# Patient Record
Sex: FEMALE | Race: WHITE | NOT HISPANIC OR LATINO | Employment: UNEMPLOYED | URBAN - METROPOLITAN AREA
[De-identification: names, ages, dates, MRNs, and addresses within clinical notes are randomized per-mention and may not be internally consistent; named-entity substitution may affect disease eponyms.]

---

## 2017-03-24 ENCOUNTER — HISTORICAL (OUTPATIENT)
Dept: LAB | Facility: HOSPITAL | Age: 57
End: 2017-03-24

## 2017-08-29 ENCOUNTER — HISTORICAL (OUTPATIENT)
Dept: LAB | Facility: HOSPITAL | Age: 57
End: 2017-08-29

## 2017-08-29 LAB
ABS NEUT (OLG): 2.5 X10(3)/MCL (ref 1.5–6.9)
BASOPHILS # BLD AUTO: 0 X10(3)/MCL (ref 0–0.1)
BASOPHILS NFR BLD AUTO: 0 % (ref 0–1)
EOSINOPHIL # BLD AUTO: 0.1 X10(3)/MCL (ref 0–0.6)
EOSINOPHIL NFR BLD AUTO: 3 % (ref 0–5)
ERYTHROCYTE [DISTWIDTH] IN BLOOD BY AUTOMATED COUNT: 13.2 % (ref 11.5–17)
ERYTHROCYTE [SEDIMENTATION RATE] IN BLOOD: 10 MM/HR (ref 0–20)
HCT VFR BLD AUTO: 41.9 % (ref 36–48)
HGB BLD-MCNC: 14.3 GM/DL (ref 12–16)
LYMPHOCYTES # BLD AUTO: 1.8 X10(3)/MCL (ref 0.5–4.1)
LYMPHOCYTES NFR BLD AUTO: 36 % (ref 15–40)
MCH RBC QN AUTO: 33 PG (ref 27–34)
MCHC RBC AUTO-ENTMCNC: 34 GM/DL (ref 31–36)
MCV RBC AUTO: 96 FL (ref 80–99)
MONOCYTES # BLD AUTO: 0.6 X10(3)/MCL (ref 0–1.1)
MONOCYTES NFR BLD AUTO: 11 % (ref 4–12)
NEUTROPHILS # BLD AUTO: 2.5 X10(3)/MCL (ref 1.5–6.9)
NEUTROPHILS NFR BLD AUTO: 49 % (ref 43–75)
PLATELET # BLD AUTO: 228 X10(3)/MCL (ref 140–400)
PMV BLD AUTO: 10.8 FL (ref 6.8–10)
RBC # BLD AUTO: 4.38 X10(6)/MCL (ref 4.2–5.4)
RHEUMATOID FACT SERPL-ACNC: <10 IU/ML (ref 0–15)
URATE SERPL-MCNC: 7.2 MG/DL (ref 2.6–7.2)
WBC # SPEC AUTO: 5 X10(3)/MCL (ref 4.5–11.5)

## 2018-08-08 ENCOUNTER — HISTORICAL (OUTPATIENT)
Dept: LAB | Facility: HOSPITAL | Age: 58
End: 2018-08-08

## 2018-08-08 LAB
ABS NEUT (OLG): 3.2 X10(3)/MCL (ref 1.5–6.9)
ALBUMIN SERPL-MCNC: 3.9 GM/DL (ref 3.4–5)
ALBUMIN/GLOB SERPL: 1.2 RATIO
ALP SERPL-CCNC: 42 UNIT/L (ref 30–113)
ALT SERPL-CCNC: 25 UNIT/L (ref 10–45)
AST SERPL-CCNC: 15 UNIT/L (ref 15–37)
BASOPHILS # BLD AUTO: 0 X10(3)/MCL (ref 0–0.1)
BASOPHILS NFR BLD AUTO: 0 % (ref 0–1)
BILIRUB SERPL-MCNC: 0.3 MG/DL (ref 0.1–0.9)
BILIRUBIN DIRECT+TOT PNL SERPL-MCNC: 0.1 MG/DL (ref 0–0.3)
BILIRUBIN DIRECT+TOT PNL SERPL-MCNC: 0.2 MG/DL
BUN SERPL-MCNC: 25 MG/DL (ref 10–20)
CALCIUM SERPL-MCNC: 9.3 MG/DL (ref 8–10.5)
CHLORIDE SERPL-SCNC: 106 MMOL/L (ref 100–108)
CO2 SERPL-SCNC: 30 MMOL/L (ref 21–35)
CREAT SERPL-MCNC: 0.96 MG/DL (ref 0.7–1.3)
EOSINOPHIL # BLD AUTO: 0.3 X10(3)/MCL (ref 0–0.6)
EOSINOPHIL NFR BLD AUTO: 4 % (ref 0–5)
ERYTHROCYTE [DISTWIDTH] IN BLOOD BY AUTOMATED COUNT: 13.3 % (ref 11.5–17)
GLOBULIN SER-MCNC: 3.3 GM/DL
GLUCOSE SERPL-MCNC: 87 MG/DL (ref 75–116)
HCT VFR BLD AUTO: 38.8 % (ref 36–48)
HGB BLD-MCNC: 12.9 GM/DL (ref 12–16)
LYMPHOCYTES # BLD AUTO: 2.5 X10(3)/MCL (ref 0.5–4.1)
LYMPHOCYTES NFR BLD AUTO: 38.8 % (ref 15–40)
MCH RBC QN AUTO: 33 PG (ref 27–34)
MCHC RBC AUTO-ENTMCNC: 33 GM/DL (ref 31–36)
MCV RBC AUTO: 98 FL (ref 80–99)
MONOCYTES # BLD AUTO: 0.5 X10(3)/MCL (ref 0–1.1)
MONOCYTES NFR BLD AUTO: 7 % (ref 4–12)
NEUTROPHILS # BLD AUTO: 3.2 X10(3)/MCL (ref 1.5–6.9)
NEUTROPHILS NFR BLD AUTO: 49 % (ref 43–75)
PLATELET # BLD AUTO: 196 X10(3)/MCL (ref 140–400)
PMV BLD AUTO: 10.6 FL (ref 6.8–10)
POTASSIUM SERPL-SCNC: 4 MMOL/L (ref 3.6–5.2)
PROT SERPL-MCNC: 7.2 GM/DL (ref 6.4–8.2)
RBC # BLD AUTO: 3.95 X10(6)/MCL (ref 4.2–5.4)
SODIUM SERPL-SCNC: 143 MMOL/L (ref 135–145)
WBC # SPEC AUTO: 6.4 X10(3)/MCL (ref 4.5–11.5)

## 2020-06-22 ENCOUNTER — HISTORICAL (OUTPATIENT)
Dept: LAB | Facility: HOSPITAL | Age: 60
End: 2020-06-22

## 2020-06-22 LAB
ABS NEUT (OLG): 2.5 X10(3)/MCL (ref 1.5–6.9)
ALBUMIN SERPL-MCNC: 3.7 GM/DL (ref 3.4–4.8)
ALBUMIN/GLOB SERPL: 1.2 RATIO (ref 1.1–2)
ALP SERPL-CCNC: 54 UNIT/L (ref 40–150)
ALT SERPL-CCNC: 15 UNIT/L (ref 0–55)
AST SERPL-CCNC: 12 UNIT/L (ref 5–34)
BASOPHILS # BLD AUTO: 0 X10(3)/MCL (ref 0–0.1)
BASOPHILS NFR BLD AUTO: 1 % (ref 0–1)
BILIRUB SERPL-MCNC: 0.4 MG/DL
BILIRUBIN DIRECT+TOT PNL SERPL-MCNC: 0.1 MG/DL (ref 0–0.5)
BILIRUBIN DIRECT+TOT PNL SERPL-MCNC: 0.3 MG/DL (ref 0–0.8)
BUN SERPL-MCNC: 19 MG/DL (ref 9.8–20.1)
CALCIUM SERPL-MCNC: 9.2 MG/DL (ref 8.4–10.2)
CHLORIDE SERPL-SCNC: 109 MMOL/L (ref 98–107)
CHOLEST SERPL-MCNC: 275 MG/DL
CHOLEST/HDLC SERPL: 6 {RATIO} (ref 0–5)
CO2 SERPL-SCNC: 26 MMOL/L (ref 23–31)
CREAT SERPL-MCNC: 1.09 MG/DL (ref 0.55–1.02)
EOSINOPHIL # BLD AUTO: 0.2 X10(3)/MCL (ref 0–0.6)
EOSINOPHIL NFR BLD AUTO: 4 % (ref 0–5)
ERYTHROCYTE [DISTWIDTH] IN BLOOD BY AUTOMATED COUNT: 12.9 % (ref 11.5–17)
GLOBULIN SER-MCNC: 3.1 GM/DL (ref 2.4–3.5)
GLUCOSE SERPL-MCNC: 95 MG/DL (ref 82–115)
HCT VFR BLD AUTO: 39.9 % (ref 36–48)
HDLC SERPL-MCNC: 44 MG/DL (ref 35–60)
HGB BLD-MCNC: 13.3 GM/DL (ref 12–16)
IMM GRANULOCYTES # BLD AUTO: 0.01 10*3/UL (ref 0–0.02)
IMM GRANULOCYTES NFR BLD AUTO: 0.2 % (ref 0–0.43)
LDLC SERPL CALC-MCNC: 191 MG/DL (ref 50–140)
LYMPHOCYTES # BLD AUTO: 2.2 X10(3)/MCL (ref 0.5–4.1)
LYMPHOCYTES NFR BLD AUTO: 40 % (ref 15–40)
MCH RBC QN AUTO: 32 PG (ref 27–34)
MCHC RBC AUTO-ENTMCNC: 33 GM/DL (ref 31–36)
MCV RBC AUTO: 96 FL (ref 80–99)
MONOCYTES # BLD AUTO: 0.5 X10(3)/MCL (ref 0–1.1)
MONOCYTES NFR BLD AUTO: 9 % (ref 4–12)
NEUTROPHILS # BLD AUTO: 2.5 X10(3)/MCL (ref 1.5–6.9)
NEUTROPHILS NFR BLD AUTO: 46 % (ref 43–75)
PLATELET # BLD AUTO: 196 X10(3)/MCL (ref 140–400)
PMV BLD AUTO: 10 FL (ref 6.8–10)
POTASSIUM SERPL-SCNC: 4.1 MMOL/L (ref 3.5–5.1)
PROT SERPL-MCNC: 6.8 GM/DL (ref 5.8–7.6)
RBC # BLD AUTO: 4.15 X10(6)/MCL (ref 4.2–5.4)
SODIUM SERPL-SCNC: 143 MMOL/L (ref 136–145)
TRIGL SERPL-MCNC: 198 MG/DL (ref 37–140)
TSH SERPL-ACNC: 2.2 UIU/ML (ref 0.35–4.94)
VLDLC SERPL CALC-MCNC: 40 MG/DL
WBC # SPEC AUTO: 5.4 X10(3)/MCL (ref 4.5–11.5)

## 2020-12-22 ENCOUNTER — HISTORICAL (OUTPATIENT)
Dept: LAB | Facility: HOSPITAL | Age: 60
End: 2020-12-22

## 2020-12-22 LAB
ABS NEUT (OLG): 2.3 X10(3)/MCL (ref 1.5–6.9)
ALBUMIN SERPL-MCNC: 4 GM/DL (ref 3.4–4.8)
ALBUMIN/GLOB SERPL: 1.3 RATIO (ref 1.1–2)
ALP SERPL-CCNC: 68 UNIT/L (ref 40–150)
ALT SERPL-CCNC: 22 UNIT/L (ref 0–55)
AST SERPL-CCNC: 16 UNIT/L (ref 5–34)
BASOPHILS # BLD AUTO: 0 X10(3)/MCL (ref 0–0.1)
BASOPHILS NFR BLD AUTO: 1 % (ref 0–1)
BILIRUB SERPL-MCNC: 0.5 MG/DL
BILIRUBIN DIRECT+TOT PNL SERPL-MCNC: 0.2 MG/DL (ref 0–0.5)
BILIRUBIN DIRECT+TOT PNL SERPL-MCNC: 0.3 MG/DL (ref 0–0.8)
BUN SERPL-MCNC: 16 MG/DL (ref 9.8–20.1)
CALCIUM SERPL-MCNC: 9.4 MG/DL (ref 8.4–10.2)
CHLORIDE SERPL-SCNC: 105 MMOL/L (ref 98–107)
CHOLEST SERPL-MCNC: 195 MG/DL
CHOLEST/HDLC SERPL: 5 {RATIO} (ref 0–5)
CO2 SERPL-SCNC: 27 MMOL/L (ref 23–31)
CREAT SERPL-MCNC: 1.07 MG/DL (ref 0.55–1.02)
EOSINOPHIL # BLD AUTO: 0.1 X10(3)/MCL (ref 0–0.6)
EOSINOPHIL NFR BLD AUTO: 3 % (ref 0–5)
ERYTHROCYTE [DISTWIDTH] IN BLOOD BY AUTOMATED COUNT: 12.6 % (ref 11.5–17)
GLOBULIN SER-MCNC: 3.1 GM/DL (ref 2.4–3.5)
GLUCOSE SERPL-MCNC: 100 MG/DL (ref 82–115)
HCT VFR BLD AUTO: 40.9 % (ref 36–48)
HDLC SERPL-MCNC: 43 MG/DL (ref 35–60)
HGB BLD-MCNC: 13.3 GM/DL (ref 12–16)
IMM GRANULOCYTES # BLD AUTO: 0.02 10*3/UL (ref 0–0.02)
IMM GRANULOCYTES NFR BLD AUTO: 0.4 % (ref 0–0.43)
LDLC SERPL CALC-MCNC: 116 MG/DL (ref 50–140)
LYMPHOCYTES # BLD AUTO: 2 X10(3)/MCL (ref 0.5–4.1)
LYMPHOCYTES NFR BLD AUTO: 41 % (ref 15–40)
MCH RBC QN AUTO: 32 PG (ref 27–34)
MCHC RBC AUTO-ENTMCNC: 32 GM/DL (ref 31–36)
MCV RBC AUTO: 97 FL (ref 80–99)
MONOCYTES # BLD AUTO: 0.5 X10(3)/MCL (ref 0–1.1)
MONOCYTES NFR BLD AUTO: 10 % (ref 4–12)
NEUTROPHILS # BLD AUTO: 2.3 X10(3)/MCL (ref 1.5–6.9)
NEUTROPHILS NFR BLD AUTO: 46 % (ref 43–75)
PLATELET # BLD AUTO: 217 X10(3)/MCL (ref 140–400)
PMV BLD AUTO: 10.1 FL (ref 6.8–10)
POTASSIUM SERPL-SCNC: 4 MMOL/L (ref 3.5–5.1)
PROT SERPL-MCNC: 7.1 GM/DL (ref 5.8–7.6)
RBC # BLD AUTO: 4.2 X10(6)/MCL (ref 4.2–5.4)
SODIUM SERPL-SCNC: 142 MMOL/L (ref 136–145)
TRIGL SERPL-MCNC: 181 MG/DL (ref 37–140)
TSH SERPL-ACNC: 1.95 UIU/ML (ref 0.35–4.94)
VLDLC SERPL CALC-MCNC: 36 MG/DL
WBC # SPEC AUTO: 5 X10(3)/MCL (ref 4.5–11.5)

## 2021-02-23 LAB
ABS NEUT (OLG): 2 X10(3)/MCL (ref 1.5–6.9)
ALBUMIN SERPL-MCNC: 3.9 GM/DL (ref 3.4–4.8)
ALBUMIN/GLOB SERPL: 1.1 RATIO (ref 1.1–2)
ALP SERPL-CCNC: 67 UNIT/L (ref 40–150)
ALT SERPL-CCNC: 29 UNIT/L (ref 0–55)
APTT PPP: 29 SEC (ref 23.4–34.9)
AST SERPL-CCNC: 22 UNIT/L (ref 5–34)
BILIRUB SERPL-MCNC: 0.4 MG/DL
BILIRUBIN DIRECT+TOT PNL SERPL-MCNC: 0.2 MG/DL (ref 0–0.5)
BILIRUBIN DIRECT+TOT PNL SERPL-MCNC: 0.2 MG/DL (ref 0–0.8)
BUN SERPL-MCNC: 19 MG/DL (ref 9.8–20.1)
CALCIUM SERPL-MCNC: 9.3 MG/DL (ref 8.4–10.2)
CHLORIDE SERPL-SCNC: 102 MMOL/L (ref 98–107)
CO2 SERPL-SCNC: 28 MMOL/L (ref 23–31)
CREAT SERPL-MCNC: 1.16 MG/DL (ref 0.55–1.02)
ERYTHROCYTE [DISTWIDTH] IN BLOOD BY AUTOMATED COUNT: 12.5 % (ref 11.5–17)
GLOBULIN SER-MCNC: 3.4 GM/DL (ref 2.4–3.5)
GLUCOSE SERPL-MCNC: 91 MG/DL (ref 82–115)
HCT VFR BLD AUTO: 40.4 % (ref 36–48)
HGB BLD-MCNC: 13.5 GM/DL (ref 12–16)
INR PPP: 1 (ref 2–3)
MCH RBC QN AUTO: 32 PG (ref 27–34)
MCHC RBC AUTO-ENTMCNC: 33 GM/DL (ref 31–36)
MCV RBC AUTO: 96 FL (ref 80–99)
PLATELET # BLD AUTO: 194 X10(3)/MCL (ref 140–400)
PMV BLD AUTO: 10.2 FL (ref 6.8–10)
POTASSIUM SERPL-SCNC: 3.9 MMOL/L (ref 3.5–5.1)
PROT SERPL-MCNC: 7.3 GM/DL (ref 5.8–7.6)
PROTHROMBIN TIME: 12.7 SEC (ref 11.7–14.5)
RBC # BLD AUTO: 4.22 X10(6)/MCL (ref 4.2–5.4)
SODIUM SERPL-SCNC: 139 MMOL/L (ref 136–145)
WBC # SPEC AUTO: 4.5 X10(3)/MCL (ref 4.5–11.5)

## 2021-02-25 ENCOUNTER — HISTORICAL (OUTPATIENT)
Dept: ANESTHESIOLOGY | Facility: HOSPITAL | Age: 61
End: 2021-02-25

## 2021-03-15 LAB
ABS NEUT (OLG): 2.91 X10(3)/MCL (ref 2.1–9.2)
ALBUMIN SERPL-MCNC: 4.2 GM/DL (ref 3.4–4.8)
ALBUMIN/GLOB SERPL: 1.4 RATIO (ref 1.1–2)
ALP SERPL-CCNC: 70 UNIT/L (ref 40–150)
ALT SERPL-CCNC: 15 UNIT/L (ref 0–55)
AST SERPL-CCNC: 14 UNIT/L (ref 5–34)
BASOPHILS # BLD AUTO: 0 X10(3)/MCL (ref 0–0.2)
BASOPHILS NFR BLD AUTO: 1 %
BILIRUB SERPL-MCNC: 0.6 MG/DL
BILIRUBIN DIRECT+TOT PNL SERPL-MCNC: 0.2 MG/DL (ref 0–0.5)
BILIRUBIN DIRECT+TOT PNL SERPL-MCNC: 0.4 MG/DL (ref 0–0.8)
BUN SERPL-MCNC: 15.5 MG/DL (ref 9.8–20.1)
CALCIUM SERPL-MCNC: 9.6 MG/DL (ref 8.4–10.2)
CHLORIDE SERPL-SCNC: 105 MMOL/L (ref 98–107)
CO2 SERPL-SCNC: 28 MMOL/L (ref 23–31)
CREAT SERPL-MCNC: 1.12 MG/DL (ref 0.55–1.02)
EOSINOPHIL # BLD AUTO: 0.2 X10(3)/MCL (ref 0–0.9)
EOSINOPHIL NFR BLD AUTO: 4 %
ERYTHROCYTE [DISTWIDTH] IN BLOOD BY AUTOMATED COUNT: 12.7 % (ref 11.5–17)
GLOBULIN SER-MCNC: 2.9 GM/DL (ref 2.4–3.5)
GLUCOSE SERPL-MCNC: 81 MG/DL (ref 82–115)
HCT VFR BLD AUTO: 41.3 % (ref 37–47)
HGB BLD-MCNC: 13.6 GM/DL (ref 12–16)
LYMPHOCYTES # BLD AUTO: 2.6 X10(3)/MCL (ref 0.6–4.6)
LYMPHOCYTES NFR BLD AUTO: 41 %
MCH RBC QN AUTO: 32.4 PG (ref 27–31)
MCHC RBC AUTO-ENTMCNC: 32.9 GM/DL (ref 33–36)
MCV RBC AUTO: 98.3 FL (ref 80–94)
MONOCYTES # BLD AUTO: 0.5 X10(3)/MCL (ref 0.1–1.3)
MONOCYTES NFR BLD AUTO: 8 %
NEUTROPHILS # BLD AUTO: 2.91 X10(3)/MCL (ref 2.1–9.2)
NEUTROPHILS NFR BLD AUTO: 46 %
PLATELET # BLD AUTO: 226 X10(3)/MCL (ref 130–400)
PMV BLD AUTO: 11.2 FL (ref 9.4–12.4)
POTASSIUM SERPL-SCNC: 4.3 MMOL/L (ref 3.5–5.1)
PROT SERPL-MCNC: 7.1 GM/DL (ref 5.8–7.6)
RBC # BLD AUTO: 4.2 X10(6)/MCL (ref 4.2–5.4)
SARS-COV-2 RNA RESP QL NAA+PROBE: NOT DETECTED
SODIUM SERPL-SCNC: 143 MMOL/L (ref 136–145)
WBC # SPEC AUTO: 6.4 X10(3)/MCL (ref 4.5–11.5)

## 2021-03-18 ENCOUNTER — HISTORICAL (OUTPATIENT)
Dept: SURGERY | Facility: HOSPITAL | Age: 61
End: 2021-03-18

## 2021-05-10 LAB
ABS NEUT (OLG): 3.4 X10(3)/MCL (ref 1.5–6.9)
ALBUMIN SERPL-MCNC: 3.8 GM/DL (ref 3.4–4.8)
ALBUMIN/GLOB SERPL: 1.1 RATIO (ref 1.1–2)
ALP SERPL-CCNC: 76 UNIT/L (ref 40–150)
ALT SERPL-CCNC: 19 UNIT/L (ref 0–55)
APTT PPP: 32.9 SEC (ref 23.4–34.9)
AST SERPL-CCNC: 12 UNIT/L (ref 5–34)
BASOPHILS # BLD AUTO: 0.1 X10(3)/MCL (ref 0–0.1)
BASOPHILS NFR BLD AUTO: 1 % (ref 0–1)
BILIRUB SERPL-MCNC: 0.2 MG/DL
BILIRUBIN DIRECT+TOT PNL SERPL-MCNC: 0.1 MG/DL (ref 0–0.5)
BILIRUBIN DIRECT+TOT PNL SERPL-MCNC: 0.1 MG/DL (ref 0–0.8)
BUN SERPL-MCNC: 18 MG/DL (ref 9.8–20.1)
CALCIUM SERPL-MCNC: 9.7 MG/DL (ref 8.4–10.2)
CHLORIDE SERPL-SCNC: 108 MMOL/L (ref 98–107)
CHOLEST SERPL-MCNC: 170 MG/DL
CHOLEST/HDLC SERPL: 4 {RATIO} (ref 0–5)
CO2 SERPL-SCNC: 28 MMOL/L (ref 23–31)
CREAT SERPL-MCNC: 1.21 MG/DL (ref 0.55–1.02)
EOSINOPHIL # BLD AUTO: 0.2 X10(3)/MCL (ref 0–0.6)
EOSINOPHIL NFR BLD AUTO: 3 % (ref 0–5)
ERYTHROCYTE [DISTWIDTH] IN BLOOD BY AUTOMATED COUNT: 13 % (ref 11.5–17)
GLOBULIN SER-MCNC: 3.4 GM/DL (ref 2.4–3.5)
GLUCOSE SERPL-MCNC: 94 MG/DL (ref 82–115)
HCT VFR BLD AUTO: 38.5 % (ref 36–48)
HDLC SERPL-MCNC: 43 MG/DL (ref 35–60)
HGB BLD-MCNC: 12.9 GM/DL (ref 12–16)
IMM GRANULOCYTES # BLD AUTO: 0.04 10*3/UL (ref 0–0.02)
IMM GRANULOCYTES NFR BLD AUTO: 0.6 % (ref 0–0.43)
INR PPP: 0.9 (ref 2–3)
LDLC SERPL CALC-MCNC: 104 MG/DL (ref 50–140)
LYMPHOCYTES # BLD AUTO: 2.6 X10(3)/MCL (ref 0.5–4.1)
LYMPHOCYTES NFR BLD AUTO: 38 % (ref 15–40)
MCH RBC QN AUTO: 33 PG (ref 27–34)
MCHC RBC AUTO-ENTMCNC: 34 GM/DL (ref 31–36)
MCV RBC AUTO: 99 FL (ref 80–99)
MONOCYTES # BLD AUTO: 0.6 X10(3)/MCL (ref 0–1.1)
MONOCYTES NFR BLD AUTO: 8 % (ref 4–12)
NEUTROPHILS # BLD AUTO: 3.4 X10(3)/MCL (ref 1.5–6.9)
NEUTROPHILS NFR BLD AUTO: 50 % (ref 43–75)
PLATELET # BLD AUTO: 300 X10(3)/MCL (ref 140–400)
PMV BLD AUTO: 9.8 FL (ref 6.8–10)
POTASSIUM SERPL-SCNC: 4.2 MMOL/L (ref 3.5–5.1)
PROT SERPL-MCNC: 7.2 GM/DL (ref 5.8–7.6)
PROTHROMBIN TIME: 12.3 SEC (ref 11.7–14.5)
RBC # BLD AUTO: 3.9 X10(6)/MCL (ref 4.2–5.4)
SODIUM SERPL-SCNC: 144 MMOL/L (ref 136–145)
TRIGL SERPL-MCNC: 116 MG/DL (ref 37–140)
TSH SERPL-ACNC: 2.19 UIU/ML (ref 0.35–4.94)
VLDLC SERPL CALC-MCNC: 23 MG/DL
WBC # SPEC AUTO: 6.9 X10(3)/MCL (ref 4.5–11.5)

## 2021-05-13 ENCOUNTER — HISTORICAL (OUTPATIENT)
Dept: ANESTHESIOLOGY | Facility: HOSPITAL | Age: 61
End: 2021-05-13

## 2021-07-30 ENCOUNTER — HISTORICAL (OUTPATIENT)
Dept: RADIOLOGY | Facility: HOSPITAL | Age: 61
End: 2021-07-30

## 2021-08-02 ENCOUNTER — HISTORICAL (OUTPATIENT)
Dept: RADIOLOGY | Facility: HOSPITAL | Age: 61
End: 2021-08-02

## 2021-11-15 ENCOUNTER — HISTORICAL (OUTPATIENT)
Dept: RADIOLOGY | Facility: HOSPITAL | Age: 61
End: 2021-11-15

## 2022-04-30 NOTE — OP NOTE
PREOPERATIVE DIAGNOSES:  Nausea, bloating.    POSTOPERATIVE DIAGNOSES:    1. Verrucous lesion of the midesophagus at 20 cm, cold biopsy and removed.  2. Grade A hiatal hernia.  3. Mild to moderate chronic gastritis.    4. Status post H pylori biopsy gastric antrum.    5. Mild duodenitis.     A 60-year-old white female patient of Dr. Cayden Menjivar with bloating, abdominal cramping, subjective hyperactive bowel sounds and occasional diarrhea.  The patient was consented for the procedure in my office.  The risks and benefits of the procedure were explained to her in detail.  She is willing to undergo the risks.    PROCEDURE IN DETAIL:  She was brought down to the endoscopy suite, laid in the semi left lateral decubitus position, Demar Mendenhall CRNA, present with [employer].  Please see documentation of medications administered.     The patient was anesthetized prior to which she had some lidocaine spray and then a bite block was placed and a POM mask.  The   Olympus gastroscope was then lubricated, inserted the POM mask down into the posterior oropharynx whereupon there were no abnormalities.  Laryngeal region cords were all within normal limits.     The scope was then used, inserted through the piriform recess down through the esophageal column, whereupon there was a hernia that was present.  See details stated later.     The scope was placed into the gastric cavity.  There was some mild to moderate chronic gastritis present.  There was no active ooze, but there was potential therefore submucosally.       The scope was then used to penetrate the pylorus.  The duodenum was then evaluated.  There was some mild duodenitis that was present but no active lesions, ulcers, or masses.  The scope was pulled back and a biopsy taken of the gastric antrum, sent off for H pylori.  There were no other masses, polyps to the greater and lesser curvatures and angularis.  Just gastritis.     The scope was turned  into retroflexion.  Again, more prominent gastritis that was there.  I could see some mild areas of potential for mucosal ooze, but not active.  There was a hiatal hernia that was present grade A.  The scope was pulled back and I evaluated the hernia from a superior perspective.  The Z-line was intact with no signs of Motta's.  No signs of varices.  No other pathology except for hernia.  The scope was then used to assess the rest of the esophageal column.     At 20 cm from the teeth, there was a small, verrucous looking lesion that was cold biopsy removed, sent off for pathology in jar #2.  We will follow up on the pathology of that specifically.  The rest of the esophageal column was normal, peristalsis normal.     The patient tolerated the procedure well without any complications.    HISTORY OF PRESENT ILLNESS:  A 60-year-old white female with the above finding.  We will continue to follow up on those lesions, try to focus on reflux maneuvers with her and trying to mitigate some issues to prevent progression of her hiatal hernia that is present, not causing her a significant problem at this time from the esophageal standpoint.  Again, focus more on gastric issues with gastritis.        Thank you to Dr. Menjivar for allowing me to participate in the care of this patient.        RIGO/JOSE   DD: 02/25/2021 0845   DT: 02/25/2021 0924  Job # [id number]    cc: Cayden Menjivar M.D.

## 2022-04-30 NOTE — OP NOTE
PREOPERATIVE DIAGNOSIS:  Screening colonoscopy.    POSTOPERATIVE DIAGNOSIS:  Normal colon, suboptimal prep.    INDICATIONS:  A 61-year-old white female never having undergone screening colonoscopy.  Average colorectal cancer risk.  No risk factors.  No __________ symptoms.    DESCRIPTION OF PROCEDURE:  The patient was consented for the procedure in my office.  The risks and benefits of the procedure were explained to her in detail.  She is willing to undergo the risks.     She was brought down to the endoscopy suite, laid in the left lateral decubitus position.     Terrence Brush CRNA, present.  Please see his documentation for medications administered.       Rectal exam was performed.  It was within normal limits.  No internal nor external abnormalities.  Good rectal tone.     The Olympus colonoscope was lubricated, advanced all the way to the cecum.  The cecum was visualized and photographed.  She did have some mild laxity to the right side.  I could not fully intubate the terminal ileum but I could clearly see its orifice.  There were no abnormalities emanating from it.  I washed around it as much as I could in the cecum.  She had a suboptimal prep.  I did not see any obvious masses or polyps.     360-degree circumferential views were taken of the entire colon.  There were no abnormalities noted to the ascending colon, hepatic flexure, transverse colon, splenic flexure, descending, and sigmoid.  The rectum in retroflexion showed no internal abnormalities.     In summary, a 61-year-old white female with normal colon, otherwise suboptimal prep.     Because of this, will recommend repeat colonoscopy in 5 years unless clinically indicated sooner.       Thank you, Dr. Menjivar, for allowing me assist in the care of this patient.        RIGO/JOSE   DD: 05/13/2021 0822   DT: 05/13/2021 0936  Job # [id number]    cc: MARINA Dang III, M.D.

## 2022-04-30 NOTE — OP NOTE
DATE OF SURGERY:    03/18/2021    SURGEON:  Tong Rangel MD    PREOPERATIVE DIAGNOSIS:  Stress urinary incontinence.    POSTOPERATIVE DIAGNOSIS:  Stress urinary incontinence.    PROCEDURE:  Cystoscopy with Macroplastique injection of the bladder neck.    DESCRIPTION OF PROCEDURE:  After informed consent was obtained, the patient was taken to the operating room.  After a preoperative dose of antibiotics, she was given a general anesthetic and placed in a dorsal lithotomy position.  Her genitals were prepped and draped in the usual sterile fashion.  I placed the hysteroscope through the urethra into the bladder.  Bladder was inspected.  There were no masses, lesions, or stones identified.  I came back to a couple of centimeters distal to the bladder neck.  I placed my Macroplastique needle in submucosal position, first in the 3 o'clock position and injected a half of Macroplastique syringe on that side.  I waited approximately 1 minute before removing the needle.  I then placed the needle in the submucosal position in the 9 o'clock position and injected the other half of the Macroplastique syringe.  Again, I had the needle in place approximately 1 minute.  When it came out, she had great coaptation of the urethra.  I removed the hysteroscope.  She was extubated and brought to the recovery room in good condition.        ______________________________  MD KIMBERLY Bryant/HARVEY  DD:  03/18/2021  Time:  03:04PM  DT:  03/18/2021  Time:  03:13PM  Job #:  [id number]

## 2022-12-06 ENCOUNTER — LAB VISIT (OUTPATIENT)
Dept: LAB | Facility: HOSPITAL | Age: 62
End: 2022-12-06
Attending: FAMILY MEDICINE
Payer: COMMERCIAL

## 2022-12-06 DIAGNOSIS — I11.9 MALIGNANT HYPERTENSIVE HEART DISEASE WITHOUT HEART FAILURE: Primary | ICD-10-CM

## 2022-12-06 LAB
ALBUMIN SERPL-MCNC: 4.2 GM/DL (ref 3.4–4.8)
ALBUMIN/GLOB SERPL: 1.4 RATIO (ref 1.1–2)
ALP SERPL-CCNC: 70 UNIT/L (ref 40–150)
ALT SERPL-CCNC: 11 UNIT/L (ref 0–55)
AST SERPL-CCNC: 17 UNIT/L (ref 5–34)
BASOPHILS # BLD AUTO: 0.04 X10(3)/MCL (ref 0–0.2)
BASOPHILS NFR BLD AUTO: 0.6 %
BILIRUBIN DIRECT+TOT PNL SERPL-MCNC: 0.6 MG/DL
BUN SERPL-MCNC: 17 MG/DL (ref 9.8–20.1)
CALCIUM SERPL-MCNC: 10 MG/DL (ref 8.4–10.2)
CHLORIDE SERPL-SCNC: 104 MMOL/L (ref 98–107)
CHOLEST SERPL-MCNC: 210 MG/DL
CHOLEST/HDLC SERPL: 4 {RATIO} (ref 0–5)
CO2 SERPL-SCNC: 27 MMOL/L (ref 23–31)
CREAT SERPL-MCNC: 1.1 MG/DL (ref 0.55–1.02)
EOSINOPHIL # BLD AUTO: 0.1 X10(3)/MCL (ref 0–0.9)
EOSINOPHIL NFR BLD AUTO: 1.5 %
ERYTHROCYTE [DISTWIDTH] IN BLOOD BY AUTOMATED COUNT: 12.7 % (ref 11.5–17)
GFR SERPLBLD CREATININE-BSD FMLA CKD-EPI: 57 MLS/MIN/1.73/M2
GLOBULIN SER-MCNC: 3 GM/DL (ref 2.4–3.5)
GLUCOSE SERPL-MCNC: 90 MG/DL (ref 82–115)
HCT VFR BLD AUTO: 42.2 % (ref 37–47)
HDLC SERPL-MCNC: 51 MG/DL (ref 35–60)
HGB BLD-MCNC: 14.1 GM/DL (ref 12–16)
IMM GRANULOCYTES # BLD AUTO: 0.02 X10(3)/MCL (ref 0–0.04)
IMM GRANULOCYTES NFR BLD AUTO: 0.3 %
LDLC SERPL CALC-MCNC: 121 MG/DL (ref 50–140)
LYMPHOCYTES # BLD AUTO: 2.53 X10(3)/MCL (ref 0.6–4.6)
LYMPHOCYTES NFR BLD AUTO: 37.5 %
MCH RBC QN AUTO: 32.6 PG (ref 27–31)
MCHC RBC AUTO-ENTMCNC: 33.4 MG/DL (ref 33–36)
MCV RBC AUTO: 97.7 FL (ref 80–94)
MONOCYTES # BLD AUTO: 0.61 X10(3)/MCL (ref 0.1–1.3)
MONOCYTES NFR BLD AUTO: 9.1 %
NEUTROPHILS # BLD AUTO: 3.4 X10(3)/MCL (ref 2.1–9.2)
NEUTROPHILS NFR BLD AUTO: 51 %
PLATELET # BLD AUTO: 230 X10(3)/MCL (ref 130–400)
PMV BLD AUTO: 10.1 FL (ref 7.4–10.4)
POTASSIUM SERPL-SCNC: 4 MMOL/L (ref 3.5–5.1)
PROT SERPL-MCNC: 7.2 GM/DL (ref 5.8–7.6)
RBC # BLD AUTO: 4.32 X10(6)/MCL (ref 4.2–5.4)
SODIUM SERPL-SCNC: 141 MMOL/L (ref 136–145)
TRIGL SERPL-MCNC: 189 MG/DL (ref 37–140)
TSH SERPL-ACNC: 2.59 UIU/ML (ref 0.35–4.94)
VLDLC SERPL CALC-MCNC: 38 MG/DL
WBC # SPEC AUTO: 6.7 X10(3)/MCL (ref 4.5–11.5)

## 2022-12-06 PROCEDURE — 36415 COLL VENOUS BLD VENIPUNCTURE: CPT

## 2022-12-06 PROCEDURE — 80053 COMPREHEN METABOLIC PANEL: CPT

## 2022-12-06 PROCEDURE — 80061 LIPID PANEL: CPT

## 2022-12-06 PROCEDURE — 84443 ASSAY THYROID STIM HORMONE: CPT

## 2022-12-06 PROCEDURE — 85025 COMPLETE CBC W/AUTO DIFF WBC: CPT

## 2022-12-19 ENCOUNTER — LAB VISIT (OUTPATIENT)
Dept: LAB | Facility: HOSPITAL | Age: 62
End: 2022-12-19
Attending: FAMILY MEDICINE
Payer: COMMERCIAL

## 2022-12-19 DIAGNOSIS — R68.82 DECREASED LIBIDO: Primary | ICD-10-CM

## 2022-12-19 LAB
ERYTHROCYTE [SEDIMENTATION RATE] IN BLOOD: 28 MM/HR (ref 0–20)
FSH SERPL-ACNC: 100.49 MIU/ML
LH SERPL-ACNC: 30.57 MIU/ML
PROLACTIN LEVEL (OHS): 3.38 NG/ML (ref 5.18–26.53)
T4 FREE SERPL-MCNC: 0.86 NG/DL (ref 0.7–1.48)
TSH SERPL-ACNC: 1.49 UIU/ML (ref 0.35–4.94)
URATE SERPL-MCNC: 6.8 MG/DL (ref 2.6–6)

## 2022-12-19 PROCEDURE — 84550 ASSAY OF BLOOD/URIC ACID: CPT

## 2022-12-19 PROCEDURE — 83002 ASSAY OF GONADOTROPIN (LH): CPT

## 2022-12-19 PROCEDURE — 84439 ASSAY OF FREE THYROXINE: CPT

## 2022-12-19 PROCEDURE — 84146 ASSAY OF PROLACTIN: CPT

## 2022-12-19 PROCEDURE — 36415 COLL VENOUS BLD VENIPUNCTURE: CPT

## 2022-12-19 PROCEDURE — 82671 ASSAY OF ESTROGENS: CPT

## 2022-12-19 PROCEDURE — 84443 ASSAY THYROID STIM HORMONE: CPT

## 2022-12-19 PROCEDURE — 84402 ASSAY OF FREE TESTOSTERONE: CPT

## 2022-12-19 PROCEDURE — 85651 RBC SED RATE NONAUTOMATED: CPT

## 2022-12-19 PROCEDURE — 83001 ASSAY OF GONADOTROPIN (FSH): CPT

## 2022-12-22 LAB
ESTRADIOL SERPL-MCNC: <10 PG/ML
ESTRONE SERPL-MCNC: 14 PG/ML

## 2022-12-25 LAB
TESTOST FREE SERPL-MCNC: 0.32 NG/DL
TESTOST SERPL-MCNC: 13 NG/DL (ref 8–60)

## 2023-02-07 ENCOUNTER — HOSPITAL ENCOUNTER (OUTPATIENT)
Facility: HOSPITAL | Age: 63
Discharge: HOME | End: 2023-02-08
Attending: FAMILY MEDICINE | Admitting: FAMILY MEDICINE
Payer: COMMERCIAL

## 2023-02-07 DIAGNOSIS — R07.9 CHEST PAIN: ICD-10-CM

## 2023-02-07 DIAGNOSIS — N30.01 ACUTE CYSTITIS WITH HEMATURIA: Primary | ICD-10-CM

## 2023-02-07 DIAGNOSIS — R10.9 ABDOMINAL PAIN: ICD-10-CM

## 2023-02-07 PROBLEM — F41.9 ANXIETY: Status: ACTIVE | Noted: 2023-02-07

## 2023-02-07 PROBLEM — K58.9 IRRITABLE BOWEL SYNDROME: Status: ACTIVE | Noted: 2023-02-07

## 2023-02-07 PROBLEM — E03.8 HYPOTHYROIDISM DUE TO HASHIMOTO'S THYROIDITIS: Status: ACTIVE | Noted: 2023-02-07

## 2023-02-07 PROBLEM — R10.31 ACUTE ABDOMINAL PAIN IN RIGHT LOWER QUADRANT: Status: ACTIVE | Noted: 2023-02-07

## 2023-02-07 PROBLEM — I10 HYPERTENSION: Status: ACTIVE | Noted: 2023-02-07

## 2023-02-07 PROBLEM — E06.3 HYPOTHYROIDISM DUE TO HASHIMOTO'S THYROIDITIS: Status: ACTIVE | Noted: 2023-02-07

## 2023-02-07 PROBLEM — E78.5 HYPERLIPIDEMIA: Status: ACTIVE | Noted: 2023-02-07

## 2023-02-07 LAB
ALBUMIN SERPL-MCNC: 4 G/DL (ref 3.4–4.8)
ALBUMIN/GLOB SERPL: 1.3 RATIO (ref 1.1–2)
ALP SERPL-CCNC: 67 UNIT/L (ref 40–150)
ALT SERPL-CCNC: 16 UNIT/L (ref 0–55)
APPEARANCE UR: CLEAR
AST SERPL-CCNC: 12 UNIT/L (ref 5–34)
BACTERIA #/AREA URNS AUTO: ABNORMAL /HPF
BASOPHILS # BLD AUTO: 0.03 X10(3)/MCL (ref 0–0.2)
BASOPHILS NFR BLD AUTO: 0.4 %
BILIRUB UR QL STRIP.AUTO: NEGATIVE MG/DL
BILIRUBIN DIRECT+TOT PNL SERPL-MCNC: 0.4 MG/DL
BUN SERPL-MCNC: 15 MG/DL (ref 9.8–20.1)
CALCIUM SERPL-MCNC: 9.3 MG/DL (ref 8.4–10.2)
CHLORIDE SERPL-SCNC: 109 MMOL/L (ref 98–107)
CO2 SERPL-SCNC: 26 MMOL/L (ref 23–31)
COLOR UR AUTO: YELLOW
CREAT SERPL-MCNC: 1.31 MG/DL (ref 0.55–1.02)
EOSINOPHIL # BLD AUTO: 0.08 X10(3)/MCL (ref 0–0.9)
EOSINOPHIL NFR BLD AUTO: 1.2 %
ERYTHROCYTE [DISTWIDTH] IN BLOOD BY AUTOMATED COUNT: 12.7 % (ref 11.5–17)
GFR SERPLBLD CREATININE-BSD FMLA CKD-EPI: 46 MLS/MIN/1.73/M2
GLOBULIN SER-MCNC: 3.1 GM/DL (ref 2.4–3.5)
GLUCOSE SERPL-MCNC: 94 MG/DL (ref 82–115)
GLUCOSE UR QL STRIP.AUTO: NEGATIVE MG/DL
HCT VFR BLD AUTO: 38.7 % (ref 37–47)
HGB BLD-MCNC: 13.3 GM/DL (ref 12–16)
IMM GRANULOCYTES # BLD AUTO: 0.02 X10(3)/MCL (ref 0–0.04)
IMM GRANULOCYTES NFR BLD AUTO: 0.3 %
INR BLD: 0.91 (ref 0–1.3)
KETONES UR QL STRIP.AUTO: NEGATIVE MG/DL
LEUKOCYTE ESTERASE UR QL STRIP.AUTO: ABNORMAL UNIT/L
LYMPHOCYTES # BLD AUTO: 2.56 X10(3)/MCL (ref 0.6–4.6)
LYMPHOCYTES NFR BLD AUTO: 37.5 %
MCH RBC QN AUTO: 32.6 PG
MCHC RBC AUTO-ENTMCNC: 34.4 MG/DL (ref 33–36)
MCV RBC AUTO: 94.9 FL (ref 80–94)
MONOCYTES # BLD AUTO: 0.68 X10(3)/MCL (ref 0.1–1.3)
MONOCYTES NFR BLD AUTO: 10 %
NEUTROPHILS # BLD AUTO: 3.45 X10(3)/MCL (ref 2.1–9.2)
NEUTROPHILS NFR BLD AUTO: 50.6 %
NITRITE UR QL STRIP.AUTO: POSITIVE
PH UR STRIP.AUTO: 7 [PH]
PLATELET # BLD AUTO: 229 X10(3)/MCL (ref 130–400)
PMV BLD AUTO: 9.5 FL (ref 7.4–10.4)
POTASSIUM SERPL-SCNC: 4.3 MMOL/L (ref 3.5–5.1)
PROT SERPL-MCNC: 7.1 GM/DL (ref 5.8–7.6)
PROT UR QL STRIP.AUTO: NEGATIVE MG/DL
PROTHROMBIN TIME: 12.6 SECONDS (ref 12.5–14.5)
RBC # BLD AUTO: 4.08 X10(6)/MCL (ref 4.2–5.4)
RBC #/AREA URNS AUTO: ABNORMAL /HPF
RBC UR QL AUTO: ABNORMAL UNIT/L
SODIUM SERPL-SCNC: 143 MMOL/L (ref 136–145)
SP GR UR STRIP.AUTO: 1.02
SQUAMOUS #/AREA URNS AUTO: ABNORMAL /HPF
UROBILINOGEN UR STRIP-ACNC: 0.2 MG/DL
WBC # SPEC AUTO: 6.8 X10(3)/MCL (ref 4.5–11.5)
WBC #/AREA URNS AUTO: ABNORMAL /HPF

## 2023-02-07 PROCEDURE — G0378 HOSPITAL OBSERVATION PER HR: HCPCS

## 2023-02-07 PROCEDURE — 85610 PROTHROMBIN TIME: CPT | Performed by: FAMILY MEDICINE

## 2023-02-07 PROCEDURE — 81001 URINALYSIS AUTO W/SCOPE: CPT | Performed by: FAMILY MEDICINE

## 2023-02-07 PROCEDURE — 11000001 HC ACUTE MED/SURG PRIVATE ROOM

## 2023-02-07 PROCEDURE — G0379 DIRECT REFER HOSPITAL OBSERV: HCPCS

## 2023-02-07 PROCEDURE — 80053 COMPREHEN METABOLIC PANEL: CPT | Performed by: FAMILY MEDICINE

## 2023-02-07 PROCEDURE — 25000003 PHARM REV CODE 250: Performed by: FAMILY MEDICINE

## 2023-02-07 PROCEDURE — 85025 COMPLETE CBC W/AUTO DIFF WBC: CPT | Performed by: FAMILY MEDICINE

## 2023-02-07 PROCEDURE — 63600175 PHARM REV CODE 636 W HCPCS: Performed by: FAMILY MEDICINE

## 2023-02-07 PROCEDURE — 87077 CULTURE AEROBIC IDENTIFY: CPT | Performed by: FAMILY MEDICINE

## 2023-02-07 PROCEDURE — 87088 URINE BACTERIA CULTURE: CPT | Performed by: FAMILY MEDICINE

## 2023-02-07 RX ORDER — GLUCAGON 1 MG
1 KIT INJECTION
Status: DISCONTINUED | OUTPATIENT
Start: 2023-02-07 | End: 2023-02-08 | Stop reason: HOSPADM

## 2023-02-07 RX ORDER — CARVEDILOL 6.25 MG/1
6.25 TABLET ORAL 2 TIMES DAILY WITH MEALS
COMMUNITY

## 2023-02-07 RX ORDER — NALOXONE HCL 0.4 MG/ML
0.02 VIAL (ML) INJECTION
Status: DISCONTINUED | OUTPATIENT
Start: 2023-02-07 | End: 2023-02-08 | Stop reason: HOSPADM

## 2023-02-07 RX ORDER — ONDANSETRON 2 MG/ML
4 INJECTION INTRAMUSCULAR; INTRAVENOUS EVERY 8 HOURS PRN
Status: DISCONTINUED | OUTPATIENT
Start: 2023-02-07 | End: 2023-02-08 | Stop reason: HOSPADM

## 2023-02-07 RX ORDER — BUPROPION HYDROCHLORIDE 150 MG/1
150 TABLET ORAL DAILY
COMMUNITY
Start: 2022-12-19

## 2023-02-07 RX ORDER — SIMETHICONE 80 MG
1 TABLET,CHEWABLE ORAL 4 TIMES DAILY PRN
Status: DISCONTINUED | OUTPATIENT
Start: 2023-02-07 | End: 2023-02-08 | Stop reason: HOSPADM

## 2023-02-07 RX ORDER — IBUPROFEN 200 MG
24 TABLET ORAL
Status: DISCONTINUED | OUTPATIENT
Start: 2023-02-07 | End: 2023-02-08 | Stop reason: HOSPADM

## 2023-02-07 RX ORDER — ACETAMINOPHEN 325 MG/1
650 TABLET ORAL EVERY 8 HOURS PRN
Status: DISCONTINUED | OUTPATIENT
Start: 2023-02-07 | End: 2023-02-08 | Stop reason: HOSPADM

## 2023-02-07 RX ORDER — AMLODIPINE BESYLATE 2.5 MG/1
2.5 TABLET ORAL
COMMUNITY
Start: 2022-12-06

## 2023-02-07 RX ORDER — VALSARTAN 80 MG/1
320 TABLET ORAL DAILY
Status: DISCONTINUED | OUTPATIENT
Start: 2023-02-08 | End: 2023-02-08 | Stop reason: HOSPADM

## 2023-02-07 RX ORDER — AMLODIPINE BESYLATE 2.5 MG/1
2.5 TABLET ORAL
Status: DISCONTINUED | OUTPATIENT
Start: 2023-02-07 | End: 2023-02-08 | Stop reason: HOSPADM

## 2023-02-07 RX ORDER — VALSARTAN AND HYDROCHLOROTHIAZIDE 320; 25 MG/1; MG/1
1 TABLET, FILM COATED ORAL DAILY
Status: DISCONTINUED | OUTPATIENT
Start: 2023-02-08 | End: 2023-02-07

## 2023-02-07 RX ORDER — VALSARTAN AND HYDROCHLOROTHIAZIDE 320; 25 MG/1; MG/1
1 TABLET, FILM COATED ORAL DAILY
COMMUNITY

## 2023-02-07 RX ORDER — CARVEDILOL 6.25 MG/1
6.25 TABLET ORAL 2 TIMES DAILY WITH MEALS
Status: DISCONTINUED | OUTPATIENT
Start: 2023-02-07 | End: 2023-02-08 | Stop reason: HOSPADM

## 2023-02-07 RX ORDER — SODIUM CHLORIDE 0.9 % (FLUSH) 0.9 %
2 SYRINGE (ML) INJECTION EVERY 12 HOURS PRN
Status: DISCONTINUED | OUTPATIENT
Start: 2023-02-07 | End: 2023-02-08 | Stop reason: HOSPADM

## 2023-02-07 RX ORDER — DEXTROSE MONOHYDRATE, SODIUM CHLORIDE, AND POTASSIUM CHLORIDE 50; 2.98; 4.5 G/1000ML; G/1000ML; G/1000ML
INJECTION, SOLUTION INTRAVENOUS CONTINUOUS
Status: DISCONTINUED | OUTPATIENT
Start: 2023-02-07 | End: 2023-02-08 | Stop reason: HOSPADM

## 2023-02-07 RX ORDER — ACETAMINOPHEN 325 MG/1
650 TABLET ORAL EVERY 4 HOURS PRN
Status: DISCONTINUED | OUTPATIENT
Start: 2023-02-07 | End: 2023-02-08 | Stop reason: HOSPADM

## 2023-02-07 RX ORDER — MORPHINE SULFATE 4 MG/ML
2 INJECTION, SOLUTION INTRAMUSCULAR; INTRAVENOUS EVERY 4 HOURS PRN
Status: DISCONTINUED | OUTPATIENT
Start: 2023-02-07 | End: 2023-02-08 | Stop reason: HOSPADM

## 2023-02-07 RX ORDER — BUPROPION HYDROCHLORIDE 150 MG/1
150 TABLET ORAL DAILY
Status: DISCONTINUED | OUTPATIENT
Start: 2023-02-08 | End: 2023-02-08 | Stop reason: HOSPADM

## 2023-02-07 RX ORDER — HYDROCHLOROTHIAZIDE 25 MG/1
25 TABLET ORAL DAILY
Status: DISCONTINUED | OUTPATIENT
Start: 2023-02-08 | End: 2023-02-08 | Stop reason: HOSPADM

## 2023-02-07 RX ORDER — IBUPROFEN 200 MG
16 TABLET ORAL
Status: DISCONTINUED | OUTPATIENT
Start: 2023-02-07 | End: 2023-02-08 | Stop reason: HOSPADM

## 2023-02-07 RX ADMIN — PIPERACILLIN AND TAZOBACTAM 4.5 G: 4; .5 INJECTION, POWDER, LYOPHILIZED, FOR SOLUTION INTRAVENOUS; PARENTERAL at 05:02

## 2023-02-07 RX ADMIN — AMLODIPINE BESYLATE 2.5 MG: 2.5 TABLET ORAL at 05:02

## 2023-02-07 RX ADMIN — CARVEDILOL 6.25 MG: 6.25 TABLET, FILM COATED ORAL at 05:02

## 2023-02-07 RX ADMIN — MORPHINE SULFATE 2 MG: 4 INJECTION, SOLUTION INTRAMUSCULAR; INTRAVENOUS at 05:02

## 2023-02-07 RX ADMIN — POTASSIUM CHLORIDE, DEXTROSE MONOHYDRATE AND SODIUM CHLORIDE: 300; 5; 450 INJECTION, SOLUTION INTRAVENOUS at 05:02

## 2023-02-07 NOTE — SUBJECTIVE & OBJECTIVE
No past medical history on file.    No past surgical history on file.    Review of patient's allergies indicates:  Not on File    No current facility-administered medications on file prior to encounter.     Current Outpatient Medications on File Prior to Encounter   Medication Sig    amLODIPine (NORVASC) 2.5 MG tablet Take 2.5 mg by mouth before evening meal.    buPROPion (WELLBUTRIN XL) 150 MG TB24 tablet Take 150 mg by mouth once daily.    carvediloL (COREG) 6.25 MG tablet Take 6.25 mg by mouth 2 (two) times daily with meals.    valsartan-hydrochlorothiazide (DIOVAN-HCT) 320-25 mg per tablet Take 1 tablet by mouth once daily.     Family History    None       Tobacco Use    Smoking status: Not on file    Smokeless tobacco: Not on file   Substance and Sexual Activity    Alcohol use: Not on file    Drug use: Not on file    Sexual activity: Not on file     Review of Systems   Constitutional:  Positive for appetite change. Negative for fever.   HENT: Negative.     Eyes: Negative.    Respiratory: Negative.     Cardiovascular: Negative.    Gastrointestinal:  Positive for abdominal pain. Negative for anal bleeding, blood in stool, constipation, diarrhea, nausea and vomiting.   Endocrine: Negative.    Genitourinary: Negative.  Negative for difficulty urinating, dysuria, enuresis, flank pain, frequency and hematuria.   Musculoskeletal: Negative.    Skin: Negative.    Allergic/Immunologic: Negative.    Neurological: Negative.    Hematological: Negative.    Psychiatric/Behavioral: Negative.     Objective:     Vital Signs (Most Recent):  Temp: 99 °F (37.2 °C) (02/07/23 1656)  Pulse: 84 (02/07/23 1656)  BP: (!) 194/97 (02/07/23 1656)  SpO2: 97 % (02/07/23 1656)   Vital Signs (24h Range):  Temp:  [99 °F (37.2 °C)] 99 °F (37.2 °C)  Pulse:  [84] 84  SpO2:  [97 %] 97 %  BP: (194)/(97) 194/97        There is no height or weight on file to calculate BMI.    Physical Exam  Constitutional:       General: She is not in acute distress.      Appearance: Normal appearance. She is normal weight.   HENT:      Head: Normocephalic and atraumatic.      Right Ear: Tympanic membrane normal.      Left Ear: Tympanic membrane normal.      Nose: Nose normal.      Mouth/Throat:      Mouth: Mucous membranes are moist.      Pharynx: Oropharynx is clear. No oropharyngeal exudate.   Eyes:      Extraocular Movements: Extraocular movements intact.      Pupils: Pupils are equal, round, and reactive to light.   Cardiovascular:      Rate and Rhythm: Normal rate and regular rhythm.      Pulses: Normal pulses.      Heart sounds: Normal heart sounds. No murmur heard.    No gallop.   Pulmonary:      Effort: Pulmonary effort is normal.      Breath sounds: Normal breath sounds.   Abdominal:      General: Abdomen is flat. Bowel sounds are normal.      Tenderness: There is abdominal tenderness (rlq with rebound).   Musculoskeletal:         General: Normal range of motion.      Cervical back: Normal range of motion and neck supple.   Skin:     General: Skin is warm and dry.      Capillary Refill: Capillary refill takes less than 2 seconds.   Neurological:      General: No focal deficit present.      Mental Status: She is alert and oriented to person, place, and time. Mental status is at baseline.   Psychiatric:         Mood and Affect: Mood normal.         Behavior: Behavior normal.         Thought Content: Thought content normal.         Judgment: Judgment normal.         CRANIAL NERVES     CN III, IV, VI   Pupils are equal, round, and reactive to light.     Significant Labs: All pertinent labs within the past 24 hours have been reviewed.    Significant Imaging: I have reviewed all pertinent imaging results/findings within the past 24 hours.

## 2023-02-07 NOTE — H&P
[facility] - [facility] Surgical Unit  [facility]  History & Physical    Patient Name: Harriet Espinoza  MRN: [id number]  Patient Class: IP- Inpatient  Admission Date: 2/7/2023  Attending Physician: Cayden Menjivar MD   Primary Care Provider: Cayden Menjivar MD         Patient information was obtained from patient, spouse/SO and ER records.     Subjective:     Principal Problem:Acute abdominal pain in right lower quadrant    Chief Complaint:   Chief Complaint   Patient presents with    right lower quadrant abdominal pain        HPI: The patient is a 62-year-old  female known to me well through clinic with a history of gout as well as hyperlipidemia hypertension hypothyroidism.  Comes with a an acute onset of right lower quadrant abdominal pain today no accompanying nausea or vomiting no changes in her urinary habits she is not noticed hematuria.  She is now afebrile at this time she is not recently traveled or had any questionable food.  States the pain is cramping in nature and very severe whenever she moves in certain way she can not lay down without pain.      No past medical history on file.    No past surgical history on file.    Review of patient's allergies indicates:  Not on File    No current facility-administered medications on file prior to encounter.     Current Outpatient Medications on File Prior to Encounter   Medication Sig    amLODIPine (NORVASC) 2.5 MG tablet Take 2.5 mg by mouth before evening meal.    buPROPion (WELLBUTRIN XL) 150 MG TB24 tablet Take 150 mg by mouth once daily.    carvediloL (COREG) 6.25 MG tablet Take 6.25 mg by mouth 2 (two) times daily with meals.    valsartan-hydrochlorothiazide (DIOVAN-HCT) 320-25 mg per tablet Take 1 tablet by mouth once daily.     Family History    None       Tobacco Use    Smoking status: Not on file    Smokeless tobacco: Not on file   Substance and Sexual Activity    Alcohol use: Not on file    Drug use: Not on file     Sexual activity: Not on file     Review of Systems   Constitutional:  Positive for appetite change. Negative for fever.   HENT: Negative.     Eyes: Negative.    Respiratory: Negative.     Cardiovascular: Negative.    Gastrointestinal:  Positive for abdominal pain. Negative for anal bleeding, blood in stool, constipation, diarrhea, nausea and vomiting.   Endocrine: Negative.    Genitourinary: Negative.  Negative for difficulty urinating, dysuria, enuresis, flank pain, frequency and hematuria.   Musculoskeletal: Negative.    Skin: Negative.    Allergic/Immunologic: Negative.    Neurological: Negative.    Hematological: Negative.    Psychiatric/Behavioral: Negative.     Objective:     Vital Signs (Most Recent):  Temp: 99 °F (37.2 °C) (02/07/23 1656)  Pulse: 84 (02/07/23 1656)  BP: (!) 194/97 (02/07/23 1656)  SpO2: 97 % (02/07/23 1656)   Vital Signs (24h Range):  Temp:  [99 °F (37.2 °C)] 99 °F (37.2 °C)  Pulse:  [84] 84  SpO2:  [97 %] 97 %  BP: (194)/(97) 194/97        There is no height or weight on file to calculate BMI.    Physical Exam  Constitutional:       General: She is not in acute distress.     Appearance: Normal appearance. She is normal weight.   HENT:      Head: Normocephalic and atraumatic.      Right Ear: Tympanic membrane normal.      Left Ear: Tympanic membrane normal.      Nose: Nose normal.      Mouth/Throat:      Mouth: Mucous membranes are moist.      Pharynx: Oropharynx is clear. No oropharyngeal exudate.   Eyes:      Extraocular Movements: Extraocular movements intact.      Pupils: Pupils are equal, round, and reactive to light.   Cardiovascular:      Rate and Rhythm: Normal rate and regular rhythm.      Pulses: Normal pulses.      Heart sounds: Normal heart sounds. No murmur heard.    No gallop.   Pulmonary:      Effort: Pulmonary effort is normal.      Breath sounds: Normal breath sounds.   Abdominal:      General: Abdomen is flat. Bowel sounds are normal.      Tenderness: There is abdominal  tenderness (rlq with rebound).   Musculoskeletal:         General: Normal range of motion.      Cervical back: Normal range of motion and neck supple.   Skin:     General: Skin is warm and dry.      Capillary Refill: Capillary refill takes less than 2 seconds.   Neurological:      General: No focal deficit present.      Mental Status: She is alert and oriented to person, place, and time. Mental status is at baseline.   Psychiatric:         Mood and Affect: Mood normal.         Behavior: Behavior normal.         Thought Content: Thought content normal.         Judgment: Judgment normal.         CRANIAL NERVES     CN III, IV, VI   Pupils are equal, round, and reactive to light.     Significant Labs: All pertinent labs within the past 24 hours have been reviewed.    Significant Imaging: I have reviewed all pertinent imaging results/findings within the past 24 hours.    Assessment/Plan:     * Acute abdominal pain in right lower quadrant    Stat ct of the abdomen and pelvis at this time.  If stone is present we will consult Urology if not we will consult surgery.  Have started Zosyn 3.375 q.6.  IV fluids D5 half-normal at 125 cc/hour.  She is morphine sulfate every 4 hours as needed for pain.  We continue IV Protonix for GI prophylaxis.  I will resume her home meds once she is no longer NPO.    Hypothyroidism due to Hashimoto's thyroiditis    Resume po meds when no longer npo    Anxiety  Hold p.o. medications at this time resume an able.      Hyperlipidemia  Hold statin medication at this time      Hypertension  Resume home meds once no longer p.o.        VTE Risk Mitigation (From admission, onward)         Ordered     IP VTE LOW RISK PATIENT  Once         02/07/23 1632     Place sequential compression device  Until discontinued         02/07/23 1632                   Cayden Menjivar MD  Department of Hospital Medicine   [facility] - [facility] Surgical [facility]

## 2023-02-07 NOTE — HPI
The patient is a 62-year-old  female known to me well through clinic with a history of gout as well as hyperlipidemia hypertension hypothyroidism.  Comes with a an acute onset of right lower quadrant abdominal pain today no accompanying nausea or vomiting no changes in her urinary habits she is not noticed hematuria.  She is now afebrile at this time she is not recently traveled or had any questionable food.  States the pain is cramping in nature and very severe whenever she moves in certain way she can not lay down without pain.

## 2023-02-07 NOTE — ASSESSMENT & PLAN NOTE
Stat ct of the abdomen and pelvis at this time.  If stone is present we will consult Urology if not we will consult surgery.  Have started Zosyn 3.375 q.6.  IV fluids D5 half-normal at 125 cc/hour.  She is morphine sulfate every 4 hours as needed for pain.  We continue IV Protonix for GI prophylaxis.  I will resume her home meds once she is no longer NPO.

## 2023-02-08 VITALS
OXYGEN SATURATION: 98 % | TEMPERATURE: 98 F | BODY MASS INDEX: 30.39 KG/M2 | WEIGHT: 178 LBS | DIASTOLIC BLOOD PRESSURE: 93 MMHG | HEART RATE: 83 BPM | SYSTOLIC BLOOD PRESSURE: 174 MMHG | HEIGHT: 64 IN | RESPIRATION RATE: 18 BRPM

## 2023-02-08 PROBLEM — N30.00 ACUTE CYSTITIS: Status: ACTIVE | Noted: 2023-02-08

## 2023-02-08 PROCEDURE — 94761 N-INVAS EAR/PLS OXIMETRY MLT: CPT

## 2023-02-08 PROCEDURE — 63600175 PHARM REV CODE 636 W HCPCS: Performed by: FAMILY MEDICINE

## 2023-02-08 PROCEDURE — G0378 HOSPITAL OBSERVATION PER HR: HCPCS

## 2023-02-08 PROCEDURE — 25000003 PHARM REV CODE 250: Performed by: FAMILY MEDICINE

## 2023-02-08 PROCEDURE — C9113 INJ PANTOPRAZOLE SODIUM, VIA: HCPCS | Performed by: FAMILY MEDICINE

## 2023-02-08 RX ORDER — NITROFURANTOIN 25; 75 MG/1; MG/1
100 CAPSULE ORAL 2 TIMES DAILY
Qty: 14 CAPSULE | Refills: 0 | Status: SHIPPED | OUTPATIENT
Start: 2023-02-08 | End: 2024-02-06

## 2023-02-08 RX ADMIN — POTASSIUM CHLORIDE, DEXTROSE MONOHYDRATE AND SODIUM CHLORIDE: 300; 5; 450 INJECTION, SOLUTION INTRAVENOUS at 05:02

## 2023-02-08 RX ADMIN — PIPERACILLIN AND TAZOBACTAM 4.5 G: 4; .5 INJECTION, POWDER, LYOPHILIZED, FOR SOLUTION INTRAVENOUS; PARENTERAL at 01:02

## 2023-02-08 RX ADMIN — PANTOPRAZOLE SODIUM: 40 INJECTION, POWDER, FOR SOLUTION INTRAVENOUS at 08:02

## 2023-02-08 RX ADMIN — POTASSIUM CHLORIDE, DEXTROSE MONOHYDRATE AND SODIUM CHLORIDE: 300; 5; 450 INJECTION, SOLUTION INTRAVENOUS at 02:02

## 2023-02-08 RX ADMIN — MORPHINE SULFATE 2 MG: 4 INJECTION, SOLUTION INTRAMUSCULAR; INTRAVENOUS at 12:02

## 2023-02-08 RX ADMIN — ACETAMINOPHEN 650 MG: 325 TABLET, FILM COATED ORAL at 10:02

## 2023-02-08 RX ADMIN — PIPERACILLIN AND TAZOBACTAM 4.5 G: 4; .5 INJECTION, POWDER, LYOPHILIZED, FOR SOLUTION INTRAVENOUS; PARENTERAL at 08:02

## 2023-02-08 NOTE — ASSESSMENT & PLAN NOTE
ct of the abdomen and pelvis with no  stone at present.  May have passed one.   we will consult Urology out patient.  Have started Zosyn 3.375 q.6.  IV fluids D5 half-normal at 125 cc/hour.  She is morphine sulfate every 4 hours as needed for pain.  Though currently essentually pain free.  We continue IV Protonix for GI prophylaxis.  I will resume her home meds once she is no longer NPO.    Have consulted surgery to be sure no further work up necessary  If no further work up needed , and conitnues to improve, will assume passed stone and uti

## 2023-02-08 NOTE — HOSPITAL COURSE
The patient is a 62-year-old  female seen by me in clinic.  With acute right lower quadrant abdominal pain was admitted to hospital observation acute CT was ordered and found to have no appendicitis sides hydronephrosis.  Urinalysis did show blood and leukocyte suggesting a urinary tract infection for possibility of having passed a stone she does have a history of gout.  Patient was placed on IV antibiotics and surgical consult was sought no further intervention was necessary.  Patient improved the next day with complete resolution of her pain no nausea vomiting no fever.  Indications seen as suggest the patient developed a urinary tract infection which facilitated a gouty stone which the patient apparently passed.

## 2023-02-08 NOTE — PROGRESS NOTES
[facility]  [facility] Medicine  Progress Note    Patient Name: Harriet Espinoza  MRN: [id number]  Patient Class: IP- Inpatient   Admission Date: 2/7/2023  Length of Stay: 1 days  Attending Physician: Cayden Menjivar MD  Primary Care Provider: Cayden Menjivar MD        Subjective:     Principal Problem:Acute abdominal pain in right lower quadrant        HPI:  The patient is a 62-year-old  female known to me well through clinic with a history of gout as well as hyperlipidemia hypertension hypothyroidism.  Comes with a an acute onset of right lower quadrant abdominal pain today no accompanying nausea or vomiting no changes in her urinary habits she is not noticed hematuria.  She is now afebrile at this time she is not recently traveled or had any questionable food.  States the pain is cramping in nature and very severe whenever she moves in certain way she can not lay down without pain.      Overview/Hospital Course:  Feeling better today     Pain from 9/10 to 2/10  No n/v  Ct with no stone, no appendix visualized  Ua with blood and leukocytes  No fever today        Past Medical History:   Diagnosis Date    Digestive disorder     Gout, unspecified     Hypertension        Past Surgical History:   Procedure Laterality Date    OTHER SURGICAL HISTORY         Review of patient's allergies indicates:   Allergen Reactions    Solu-medrol [methylprednisolone sodium succ] Rash       No current facility-administered medications on file prior to encounter.     Current Outpatient Medications on File Prior to Encounter   Medication Sig    amLODIPine (NORVASC) 2.5 MG tablet Take 2.5 mg by mouth before evening meal.    buPROPion (WELLBUTRIN XL) 150 MG TB24 tablet Take 150 mg by mouth once daily.    carvediloL (COREG) 6.25 MG tablet Take 6.25 mg by mouth 2 (two) times daily with meals.    valsartan-hydrochlorothiazide (DIOVAN-HCT) 320-25 mg per tablet Take 1 tablet by mouth once  daily.     Family History    None       Tobacco Use    Smoking status: Not on file    Smokeless tobacco: Not on file   Substance and Sexual Activity    Alcohol use: Not on file    Drug use: Not on file    Sexual activity: Not on file     Review of Systems   Constitutional:  Negative for appetite change and fever.   HENT: Negative.     Eyes: Negative.    Respiratory: Negative.     Cardiovascular: Negative.    Gastrointestinal:  Negative for abdominal pain, anal bleeding, blood in stool, constipation, diarrhea, nausea and vomiting.   Endocrine: Negative.    Genitourinary: Negative.  Negative for difficulty urinating, dysuria, enuresis, flank pain, frequency and hematuria.   Musculoskeletal: Negative.    Skin: Negative.    Allergic/Immunologic: Negative.    Neurological: Negative.    Hematological: Negative.    Psychiatric/Behavioral: Negative.     Objective:     Vital Signs (Most Recent):  Temp: 98.5 °F (36.9 °C) (02/08/23 0801)  Pulse: 77 (02/08/23 0801)  Resp: 18 (02/08/23 0801)  BP: (!) 158/67 (02/08/23 0801)  SpO2: 97 % (02/08/23 0801)   Vital Signs (24h Range):  Temp:  [98.3 °F (36.8 °C)-99 °F (37.2 °C)] 98.5 °F (36.9 °C)  Pulse:  [67-84] 77  Resp:  [18-20] 18  SpO2:  [94 %-97 %] 97 %  BP: (130-194)/(67-97) 158/67     Weight: 80.7 kg (178 lb)  Body mass index is 30.55 kg/m².    Physical Exam  Constitutional:       General: She is not in acute distress.     Appearance: Normal appearance. She is normal weight.   HENT:      Head: Normocephalic and atraumatic.      Right Ear: Tympanic membrane normal.      Left Ear: Tympanic membrane normal.      Nose: Nose normal.      Mouth/Throat:      Mouth: Mucous membranes are moist.      Pharynx: Oropharynx is clear. No oropharyngeal exudate.   Eyes:      Extraocular Movements: Extraocular movements intact.      Pupils: Pupils are equal, round, and reactive to light.   Cardiovascular:      Rate and Rhythm: Normal rate and regular rhythm.      Pulses: Normal pulses.       Heart sounds: Normal heart sounds. No murmur heard.    No gallop.   Pulmonary:      Effort: Pulmonary effort is normal.      Breath sounds: Normal breath sounds.   Abdominal:      General: Abdomen is flat. Bowel sounds are normal.      Tenderness: There is no abdominal tenderness (rlq with rebound).   Musculoskeletal:         General: Normal range of motion.      Cervical back: Normal range of motion and neck supple.   Skin:     General: Skin is warm and dry.      Capillary Refill: Capillary refill takes less than 2 seconds.   Neurological:      General: No focal deficit present.      Mental Status: She is alert and oriented to person, place, and time. Mental status is at baseline.   Psychiatric:         Mood and Affect: Mood normal.         Behavior: Behavior normal.         Thought Content: Thought content normal.         Judgment: Judgment normal.         CRANIAL NERVES     CN III, IV, VI   Pupils are equal, round, and reactive to light.     Significant Labs: All pertinent labs within the past 24 hours have been reviewed.    Significant Imaging: I have reviewed all pertinent imaging results/findings within the past 24 hours.      Assessment/Plan:      * Acute abdominal pain in right lower quadrant    ct of the abdomen and pelvis with no  stone at present.  May have passed one.   we will consult Urology out patient.  Have started Zosyn 3.375 q.6.  IV fluids D5 half-normal at 125 cc/hour.  She is morphine sulfate every 4 hours as needed for pain.  Though currently essentually pain free.  We continue IV Protonix for GI prophylaxis.  I will resume her home meds once she is no longer NPO.    Have consulted surgery to be sure no further work up necessary  If no further work up needed , and conitnues to improve, will assume passed stone and uti       Hypothyroidism due to Hashimoto's thyroiditis    Resume po meds when no longer npo    Anxiety  Hold p.o. medications at this time resume an able.      Irritable bowel  syndrome        Hyperlipidemia  Hold statin medication at this time      Hypertension  Resume home meds once no longer p.o.        VTE Risk Mitigation (From admission, onward)         Ordered     IP VTE LOW RISK PATIENT  Once         02/07/23 1632     Place sequential compression device  Until discontinued         02/07/23 1632                Discharge Planning   JAVI:      Code Status: Full Code   Is the patient medically ready for discharge?:     Reason for patient still in hospital (select all that apply): Patient trending condition                     Cayden Menjivar MD  Department of Hospital Medicine   [facility]

## 2023-02-08 NOTE — SUBJECTIVE & OBJECTIVE
Past Medical History:   Diagnosis Date    Digestive disorder     Gout, unspecified     Hypertension        Past Surgical History:   Procedure Laterality Date    OTHER SURGICAL HISTORY         Review of patient's allergies indicates:   Allergen Reactions    Solu-medrol [methylprednisolone sodium succ] Rash       No current facility-administered medications on file prior to encounter.     Current Outpatient Medications on File Prior to Encounter   Medication Sig    amLODIPine (NORVASC) 2.5 MG tablet Take 2.5 mg by mouth before evening meal.    buPROPion (WELLBUTRIN XL) 150 MG TB24 tablet Take 150 mg by mouth once daily.    carvediloL (COREG) 6.25 MG tablet Take 6.25 mg by mouth 2 (two) times daily with meals.    valsartan-hydrochlorothiazide (DIOVAN-HCT) 320-25 mg per tablet Take 1 tablet by mouth once daily.     Family History    None       Tobacco Use    Smoking status: Not on file    Smokeless tobacco: Not on file   Substance and Sexual Activity    Alcohol use: Not on file    Drug use: Not on file    Sexual activity: Not on file     Review of Systems   Constitutional:  Negative for appetite change and fever.   HENT: Negative.     Eyes: Negative.    Respiratory: Negative.     Cardiovascular: Negative.    Gastrointestinal:  Negative for abdominal pain, anal bleeding, blood in stool, constipation, diarrhea, nausea and vomiting.   Endocrine: Negative.    Genitourinary: Negative.  Negative for difficulty urinating, dysuria, enuresis, flank pain, frequency and hematuria.   Musculoskeletal: Negative.    Skin: Negative.    Allergic/Immunologic: Negative.    Neurological: Negative.    Hematological: Negative.    Psychiatric/Behavioral: Negative.     Objective:     Vital Signs (Most Recent):  Temp: 98.5 °F (36.9 °C) (02/08/23 0801)  Pulse: 77 (02/08/23 0801)  Resp: 18 (02/08/23 0801)  BP: (!) 158/67 (02/08/23 0801)  SpO2: 97 % (02/08/23 0801)   Vital Signs (24h Range):  Temp:  [98.3 °F (36.8 °C)-99 °F (37.2 °C)] 98.5 °F  (36.9 °C)  Pulse:  [67-84] 77  Resp:  [18-20] 18  SpO2:  [94 %-97 %] 97 %  BP: (130-194)/(67-97) 158/67     Weight: 80.7 kg (178 lb)  Body mass index is 30.55 kg/m².    Physical Exam  Constitutional:       General: She is not in acute distress.     Appearance: Normal appearance. She is normal weight.   HENT:      Head: Normocephalic and atraumatic.      Right Ear: Tympanic membrane normal.      Left Ear: Tympanic membrane normal.      Nose: Nose normal.      Mouth/Throat:      Mouth: Mucous membranes are moist.      Pharynx: Oropharynx is clear. No oropharyngeal exudate.   Eyes:      Extraocular Movements: Extraocular movements intact.      Pupils: Pupils are equal, round, and reactive to light.   Cardiovascular:      Rate and Rhythm: Normal rate and regular rhythm.      Pulses: Normal pulses.      Heart sounds: Normal heart sounds. No murmur heard.    No gallop.   Pulmonary:      Effort: Pulmonary effort is normal.      Breath sounds: Normal breath sounds.   Abdominal:      General: Abdomen is flat. Bowel sounds are normal.      Tenderness: There is no abdominal tenderness (rlq with rebound).   Musculoskeletal:         General: Normal range of motion.      Cervical back: Normal range of motion and neck supple.   Skin:     General: Skin is warm and dry.      Capillary Refill: Capillary refill takes less than 2 seconds.   Neurological:      General: No focal deficit present.      Mental Status: She is alert and oriented to person, place, and time. Mental status is at baseline.   Psychiatric:         Mood and Affect: Mood normal.         Behavior: Behavior normal.         Thought Content: Thought content normal.         Judgment: Judgment normal.         CRANIAL NERVES     CN III, IV, VI   Pupils are equal, round, and reactive to light.     Significant Labs: All pertinent labs within the past 24 hours have been reviewed.    Significant Imaging: I have reviewed all pertinent imaging results/findings within the past 24  hours.

## 2023-02-08 NOTE — CONSULTS
[facility]  General Surgery  Consult Note    Consults  Subjective:     Chief Complaint/Reason for Admission: .    History of Present Illness:  Acute onset of right lower quadrant pain.  Patient stated the she suddenly developed pain isolated to the right lower quadrant which she felt caused type of spasming.  She denies any changes in bowel function and frequency.  Since the admission into the hospital she has had resolution of the discomfort.  She has an up-to-date colonoscopy performed last year.  She denies any specific changes in bowel function frequency or urinary frequency.  She denies a previous history of kidney stones.  She did undergo a CT scan which revealed no significant intra-abdominal findings.  Appendicitis was not identified.  Patient had previously undergone partial hysterectomy retaining her ovaries and at that time may have undergone a appendectomy.  She currently denies fevers night sweats or chills.  Overall clinically as well as from a laboratory standpoint patient appears to have no acute surgical needs.  Have instructed the patient to consider taking Colace 100 mg twice daily to prevent constipation.  Should the symptoms return repeat evaluation can be performed at that time.  No current facility-administered medications on file prior to encounter.     Current Outpatient Medications on File Prior to Encounter   Medication Sig    amLODIPine (NORVASC) 2.5 MG tablet Take 2.5 mg by mouth before evening meal.    buPROPion (WELLBUTRIN XL) 150 MG TB24 tablet Take 150 mg by mouth once daily.    carvediloL (COREG) 6.25 MG tablet Take 6.25 mg by mouth 2 (two) times daily with meals.    valsartan-hydrochlorothiazide (DIOVAN-HCT) 320-25 mg per tablet Take 1 tablet by mouth once daily.       Review of patient's allergies indicates:   Allergen Reactions    Solu-medrol [methylprednisolone sodium succ] Rash       Past Medical History:   Diagnosis Date    Digestive  disorder     Gout, unspecified     Hypertension      Past Surgical History:   Procedure Laterality Date    OTHER SURGICAL HISTORY       Family History    None       Tobacco Use    Smoking status: Not on file    Smokeless tobacco: Not on file   Substance and Sexual Activity    Alcohol use: Not on file    Drug use: Not on file    Sexual activity: Not on file     Review of Systems  Objective:     Vital Signs (Most Recent):  Temp: 98.1 °F (36.7 °C) (02/08/23 1148)  Pulse: 79 (02/08/23 1148)  Resp: 18 (02/08/23 1148)  BP: 137/81 (02/08/23 1148)  SpO2: 96 % (02/08/23 1148) Vital Signs (24h Range):  Temp:  [98 °F (36.7 °C)-99 °F (37.2 °C)] 98.1 °F (36.7 °C)  Pulse:  [67-84] 79  Resp:  [18-20] 18  SpO2:  [94 %-97 %] 96 %  BP: (130-194)/(67-97) 137/81     Weight: 80.7 kg (178 lb)  Body mass index is 30.55 kg/m².      Intake/Output Summary (Last 24 hours) at 2/8/2023 1622  Last data filed at 2/7/2023 2146  Gross per 24 hour   Intake --   Output 200 ml   Net -200 ml       Physical Exam  Abdominal:      General: Abdomen is flat. Bowel sounds are normal. There is no distension.      Palpations: Abdomen is soft. There is no mass.      Tenderness: There is no abdominal tenderness. There is no right CVA tenderness, left CVA tenderness, guarding or rebound.      Hernia: No hernia is present.       Significant Labs:  CBC:   Recent Labs   Lab 02/07/23  1739   WBC 6.8   RBC 4.08*   HGB 13.3   HCT 38.7      MCV 94.9*   MCH 32.6   MCHC 34.4     CMP:   Recent Labs   Lab 02/07/23  1730   CALCIUM 9.3   ALBUMIN 4.0      K 4.3   CO2 26   BUN 15.0   CREATININE 1.31*   ALKPHOS 67   ALT 16   AST 12   BILITOT 0.4       Significant Diagnostics:  CT: I have reviewed all pertinent results/findings within the past 24 hours.  No significant intra-abdominal pathology identified    Assessment/Plan:     Active Diagnoses:    Diagnosis Date Noted POA    PRINCIPAL PROBLEM:  Acute abdominal pain in right lower quadrant [R10.31] 02/07/2023 Yes     Acute cystitis [N30.00] 02/08/2023 Unknown    Hypothyroidism due to Hashimoto's thyroiditis [E03.8, E06.3] 02/07/2023 Yes    Hypertension [I10] 02/07/2023 Yes    Hyperlipidemia [E78.5] 02/07/2023 Yes    Anxiety [F41.9] 02/07/2023 Yes      Problems Resolved During this Admission:       Thank you for your consult. I will sign off. Please contact us if you have any additional questions.    Annette Faith MD  General Surgery  [facility]

## 2023-02-09 NOTE — DISCHARGE SUMMARY
[facility]  Hospital Medicine  Discharge Summary      Patient Name: Harriet Espinoza  MRN: [id number]  AGUSTIN: [id number]  Patient Class: OP- Observation  Admission Date: 2/7/2023  Hospital Length of Stay: 1 days  Discharge Date and Time:  02/09/2023 12:15 PM  Attending Physician: No att. providers found   Discharging Provider: Cayden Menjivar MD  Primary Care Provider: Cayden Menjivar MD    Primary Care Team: Networked reference to record PCT     HPI:   The patient is a 62-year-old  female known to me well through clinic with a history of gout as well as hyperlipidemia hypertension hypothyroidism.  Comes with a an acute onset of right lower quadrant abdominal pain today no accompanying nausea or vomiting no changes in her urinary habits she is not noticed hematuria.  She is now afebrile at this time she is not recently traveled or had any questionable food.  States the pain is cramping in nature and very severe whenever she moves in certain way she can not lay down without pain.      * No surgery found *      Hospital Course:   The patient is a 62-year-old  female seen by me in clinic.  With acute right lower quadrant abdominal pain was admitted to hospital observation acute CT was ordered and found to have no appendicitis sides hydronephrosis.  Urinalysis did show blood and leukocyte suggesting a urinary tract infection for possibility of having passed a stone she does have a history of gout.  Patient was placed on IV antibiotics and surgical consult was sought no further intervention was necessary.  Patient improved the next day with complete resolution of her pain no nausea vomiting no fever.  Indications seen as suggest the patient developed a urinary tract infection which facilitated a gouty stone which the patient apparently passed.       Goals of Care Treatment Preferences:  Code Status: Full Code      Consults:     * Acute abdominal pain in right lower  quadrant    Is apparent that the patient had as dictated above a stone which passed.  This has completely resolved.  We will give p.o. antibiotics the patient and set up outpatient follow-up with the urologist    Acute cystitis  Continue p.o. antibiotics consisting Macrobid 100 mg twice a day and follow-up with me within 3 days      Hypothyroidism due to Hashimoto's thyroiditis    Resume po meds when no longer npo    Anxiety  Hold p.o. medications at this time resume an able.      Hyperlipidemia  Hold statin medication at this time      Hypertension  Resume home meds once no longer p.o.        Final Active Diagnoses:    Diagnosis Date Noted POA    PRINCIPAL PROBLEM:  Acute abdominal pain in right lower quadrant [R10.31] 02/07/2023 Yes    Acute cystitis [N30.00] 02/08/2023 Yes    Hypothyroidism due to Hashimoto's thyroiditis [E03.8, E06.3] 02/07/2023 Yes    Hypertension [I10] 02/07/2023 Yes    Hyperlipidemia [E78.5] 02/07/2023 Yes    Anxiety [F41.9] 02/07/2023 Yes      Problems Resolved During this Admission:       Discharged Condition: stable    Disposition: Home or Self Care    Follow Up:   Follow-up Information     Cayden Menjivar MD Follow up in 3 day(s).    Specialty: Family Medicine  Contact information:  [address]  [address]  [place] [address] [phone]                       Patient Instructions:      Diet diabetic     Activity as tolerated       Significant Diagnostic Studies: Labs:   CMP   Recent Labs   Lab 02/07/23  1730      K 4.3   CO2 26   BUN 15.0   CREATININE 1.31*   CALCIUM 9.3   ALBUMIN 4.0   BILITOT 0.4   ALKPHOS 67   AST 12   ALT 16       Pending Diagnostic Studies:     None         Medications:  Reconciled Home Medications:      Medication List      START taking these medications    nitrofurantoin (macrocrystal-monohydrate) 100 MG capsule  Commonly known as: MACROBID  Take 1 capsule (100 mg total) by mouth 2 (two) times daily.        CONTINUE taking these medications     amLODIPine 2.5 MG tablet  Commonly known as: NORVASC  Take 2.5 mg by mouth before evening meal.     buPROPion 150 MG TB24 tablet  Commonly known as: WELLBUTRIN XL  Take 150 mg by mouth once daily.     carvediloL 6.25 MG tablet  Commonly known as: COREG  Take 6.25 mg by mouth 2 (two) times daily with meals.     valsartan-hydrochlorothiazide 320-25 mg per tablet  Commonly known as: DIOVAN-HCT  Take 1 tablet by mouth once daily.            Indwelling Lines/Drains at time of discharge:   Lines/Drains/Airways     None                 Time spent on the discharge of patient: 45 minutes         Cayden Menjivar MD  Department of Hospital Medicine  [facility]

## 2023-02-09 NOTE — ASSESSMENT & PLAN NOTE
Is apparent that the patient had as dictated above a stone which passed.  This has completely resolved.  We will give p.o. antibiotics the patient and set up outpatient follow-up with the urologist

## 2023-02-09 NOTE — ASSESSMENT & PLAN NOTE
Continue p.o. antibiotics consisting Macrobid 100 mg twice a day and follow-up with me within 3 days

## 2023-02-10 LAB — BACTERIA UR CULT: ABNORMAL

## 2023-03-16 DIAGNOSIS — M79.672 LEFT FOOT PAIN: Primary | ICD-10-CM

## 2023-03-21 ENCOUNTER — HOSPITAL ENCOUNTER (OUTPATIENT)
Dept: RADIOLOGY | Facility: HOSPITAL | Age: 63
Discharge: HOME | End: 2023-03-21
Attending: FAMILY MEDICINE
Payer: COMMERCIAL

## 2023-03-21 DIAGNOSIS — M79.672 LEFT FOOT PAIN: ICD-10-CM

## 2023-03-21 PROCEDURE — A9503 TC99M MEDRONATE: HCPCS

## 2023-03-21 PROCEDURE — 78315 BONE IMAGING 3 PHASE: CPT | Mod: TC

## 2023-06-19 ENCOUNTER — HOSPITAL ENCOUNTER (OUTPATIENT)
Dept: RADIOLOGY | Facility: HOSPITAL | Age: 63
Discharge: HOME | End: 2023-06-19
Attending: FAMILY MEDICINE
Payer: COMMERCIAL

## 2023-06-19 DIAGNOSIS — M25.521 RIGHT ELBOW PAIN: ICD-10-CM

## 2023-06-19 PROCEDURE — 73070 X-RAY EXAM OF ELBOW: CPT | Mod: TC,RT

## 2023-08-04 ENCOUNTER — HOSPITAL ENCOUNTER (OUTPATIENT)
Dept: RADIOLOGY | Facility: HOSPITAL | Age: 63
Discharge: HOME | End: 2023-08-04
Attending: SPECIALIST
Payer: COMMERCIAL

## 2023-08-04 DIAGNOSIS — M79.641 HAND PAIN, RIGHT: ICD-10-CM

## 2023-08-04 PROCEDURE — 73130 X-RAY EXAM OF HAND: CPT | Mod: TC,RT

## 2023-09-12 DIAGNOSIS — M79.641 RIGHT HAND PAIN: Primary | ICD-10-CM

## 2023-09-13 ENCOUNTER — HOSPITAL ENCOUNTER (OUTPATIENT)
Dept: RADIOLOGY | Facility: HOSPITAL | Age: 63
Discharge: HOME | End: 2023-09-13
Attending: SPECIALIST
Payer: COMMERCIAL

## 2023-09-13 DIAGNOSIS — M79.641 RIGHT HAND PAIN: ICD-10-CM

## 2023-09-13 PROCEDURE — 73218 MRI UPPER EXTREMITY W/O DYE: CPT | Mod: TC,RT

## 2023-11-14 ENCOUNTER — HOSPITAL ENCOUNTER (OUTPATIENT)
Dept: RADIOLOGY | Facility: HOSPITAL | Age: 63
Discharge: HOME | End: 2023-11-14
Attending: FAMILY MEDICINE
Payer: COMMERCIAL

## 2023-11-14 DIAGNOSIS — M54.59 OTHER LOW BACK PAIN: ICD-10-CM

## 2023-11-14 PROCEDURE — 74176 CT ABD & PELVIS W/O CONTRAST: CPT | Mod: TC

## 2023-11-14 PROCEDURE — 72100 X-RAY EXAM L-S SPINE 2/3 VWS: CPT | Mod: TC

## 2024-01-30 DIAGNOSIS — M25.521 RIGHT ELBOW PAIN: Primary | ICD-10-CM

## 2024-02-05 ENCOUNTER — ANESTHESIA EVENT (OUTPATIENT)
Dept: SURGERY | Facility: HOSPITAL | Age: 64
End: 2024-02-05
Payer: COMMERCIAL

## 2024-02-05 NOTE — ANESTHESIA PREPROCEDURE EVALUATION
02/05/2024  Harriet Espinoza is a 63 y.o., female.      Pre-op Assessment    I have reviewed the Patient Summary Reports.     I have reviewed the Nursing Notes. I have reviewed the NPO Status.   I have reviewed the Medications.     Review of Systems  Anesthesia Hx:  No problems with previous Anesthesia             Denies Family Hx of Anesthesia complications.    Denies Personal Hx of Anesthesia complications.                    Social:  Non-Smoker       Cardiovascular:     Hypertension                                        Pulmonary:  Pulmonary Normal                       Renal/:  Renal/ Normal                 Hepatic/GI:  Hepatic/GI Normal                 Musculoskeletal:  Musculoskeletal Normal                Neurological:  Neurology Normal                                      Endocrine:   Hypothyroidism          Psych:  Psychiatric Normal                    Physical Exam  General: Well nourished, Cooperative, Alert and Oriented    Airway:  Mallampati: I / I  Mouth Opening: Normal  TM Distance: Normal  Tongue: Normal  Neck ROM: Normal ROM    Dental:  Intact    Chest/Lungs:  Normal Respiratory Rate    Heart:  Rate: Normal    Musculoskeletal:  Normal mobility      Anesthesia Plan  Type of Anesthesia, risks & benefits discussed:    Anesthesia Type: MAC  Intra-op Monitoring Plan: Standard ASA Monitors  Post Op Pain Control Plan: multimodal analgesia  Induction:  IV  Informed Consent: Informed consent signed with the Patient and all parties understand the risks and agree with anesthesia plan.  All questions answered.   ASA Score: 2  Day of Surgery Review of History & Physical: H&P Update referred to the surgeon/provider.  Anesthesia Plan Notes: Anesthesia plan was discussed with patient and/or representative. Risks and alternatives were discussed including the possibility of alteration of plan.     Ready For  Surgery From Anesthesia Perspective.     .

## 2024-02-06 ENCOUNTER — HOSPITAL ENCOUNTER (OUTPATIENT)
Dept: RADIOLOGY | Facility: HOSPITAL | Age: 64
Discharge: HOME | End: 2024-02-06
Attending: FAMILY MEDICINE
Payer: COMMERCIAL

## 2024-02-06 DIAGNOSIS — M25.521 RIGHT ELBOW PAIN: ICD-10-CM

## 2024-02-07 ENCOUNTER — ANESTHESIA (OUTPATIENT)
Dept: SURGERY | Facility: HOSPITAL | Age: 64
End: 2024-02-07
Payer: COMMERCIAL

## 2024-02-08 ENCOUNTER — HOSPITAL ENCOUNTER (OUTPATIENT)
Dept: RADIOLOGY | Facility: HOSPITAL | Age: 64
Discharge: HOME | End: 2024-02-08
Attending: FAMILY MEDICINE
Payer: COMMERCIAL

## 2024-02-08 PROCEDURE — 73221 MRI JOINT UPR EXTREM W/O DYE: CPT | Mod: TC,RT

## 2024-02-12 ENCOUNTER — PATIENT MESSAGE (OUTPATIENT)
Dept: ADMINISTRATIVE | Facility: OTHER | Age: 64
End: 2024-02-12
Payer: COMMERCIAL

## 2024-02-14 ENCOUNTER — HOSPITAL ENCOUNTER (OUTPATIENT)
Facility: HOSPITAL | Age: 64
Discharge: HOME | End: 2024-02-14
Attending: SURGERY | Admitting: SURGERY
Payer: COMMERCIAL

## 2024-02-14 VITALS
RESPIRATION RATE: 19 BRPM | HEART RATE: 78 BPM | SYSTOLIC BLOOD PRESSURE: 144 MMHG | OXYGEN SATURATION: 98 % | TEMPERATURE: 98 F | HEIGHT: 64 IN | DIASTOLIC BLOOD PRESSURE: 84 MMHG | BODY MASS INDEX: 29.53 KG/M2 | WEIGHT: 173 LBS

## 2024-02-14 DIAGNOSIS — L72.3 SEBACEOUS CYST: ICD-10-CM

## 2024-02-14 DIAGNOSIS — L72.0 EPIDERMAL CYST OF FACE: Primary | ICD-10-CM

## 2024-02-14 PROCEDURE — D9220AH HC ANESTHESIA PROFESSIONAL FEE: Mod: QZ,P2,QS | Performed by: NURSE ANESTHETIST, CERTIFIED REGISTERED

## 2024-02-14 PROCEDURE — 63600175 PHARM REV CODE 636 W HCPCS: Performed by: NURSE ANESTHETIST, CERTIFIED REGISTERED

## 2024-02-14 PROCEDURE — 25000003 PHARM REV CODE 250: Performed by: SURGERY

## 2024-02-14 PROCEDURE — 71000016 HC POSTOP RECOV ADDL HR: Performed by: SURGERY

## 2024-02-14 PROCEDURE — 36000706: Performed by: SURGERY

## 2024-02-14 PROCEDURE — 25000003 PHARM REV CODE 250: Performed by: NURSE ANESTHETIST, CERTIFIED REGISTERED

## 2024-02-14 PROCEDURE — 36000707: Performed by: SURGERY

## 2024-02-14 PROCEDURE — 00300 ANES ALL PX INTEG H/N/PTRUNK: CPT | Mod: QZ,P2,QS | Performed by: NURSE ANESTHETIST, CERTIFIED REGISTERED

## 2024-02-14 PROCEDURE — 71000015 HC POSTOP RECOV 1ST HR: Performed by: SURGERY

## 2024-02-14 PROCEDURE — 37000009 HC ANESTHESIA EA ADD 15 MINS: Performed by: SURGERY

## 2024-02-14 PROCEDURE — 63600175 PHARM REV CODE 636 W HCPCS: Performed by: SURGERY

## 2024-02-14 PROCEDURE — 37000008 HC ANESTHESIA 1ST 15 MINUTES: Performed by: SURGERY

## 2024-02-14 RX ORDER — SODIUM CHLORIDE 0.9 % (FLUSH) 0.9 %
10 SYRINGE (ML) INJECTION
Status: DISCONTINUED | OUTPATIENT
Start: 2024-02-14 | End: 2024-02-14 | Stop reason: HOSPADM

## 2024-02-14 RX ORDER — CEFAZOLIN SODIUM 1 G/3ML
2 INJECTION, POWDER, FOR SOLUTION INTRAMUSCULAR; INTRAVENOUS
Status: DISCONTINUED | OUTPATIENT
Start: 2024-02-14 | End: 2024-02-14 | Stop reason: HOSPADM

## 2024-02-14 RX ORDER — SODIUM CHLORIDE, SODIUM LACTATE, POTASSIUM CHLORIDE, CALCIUM CHLORIDE 600; 310; 30; 20 MG/100ML; MG/100ML; MG/100ML; MG/100ML
INJECTION, SOLUTION INTRAVENOUS CONTINUOUS
Status: ACTIVE | OUTPATIENT
Start: 2024-02-14

## 2024-02-14 RX ORDER — SODIUM CHLORIDE 9 MG/ML
INJECTION, SOLUTION INTRAVENOUS CONTINUOUS
Status: DISCONTINUED | OUTPATIENT
Start: 2024-02-14 | End: 2024-02-14 | Stop reason: HOSPADM

## 2024-02-14 RX ORDER — FENTANYL CITRATE 50 UG/ML
INJECTION, SOLUTION INTRAMUSCULAR; INTRAVENOUS
Status: DISCONTINUED | OUTPATIENT
Start: 2024-02-14 | End: 2024-02-14

## 2024-02-14 RX ORDER — MORPHINE SULFATE 10 MG/ML
3 INJECTION INTRAMUSCULAR; INTRAVENOUS; SUBCUTANEOUS
Status: DISCONTINUED | OUTPATIENT
Start: 2024-02-14 | End: 2024-02-14 | Stop reason: HOSPADM

## 2024-02-14 RX ORDER — PROPOFOL 10 MG/ML
VIAL (ML) INTRAVENOUS
Status: DISCONTINUED | OUTPATIENT
Start: 2024-02-14 | End: 2024-02-14

## 2024-02-14 RX ORDER — LIDOCAINE HYDROCHLORIDE 10 MG/ML
INJECTION, SOLUTION EPIDURAL; INFILTRATION; INTRACAUDAL; PERINEURAL
Status: DISCONTINUED | OUTPATIENT
Start: 2024-02-14 | End: 2024-02-14

## 2024-02-14 RX ORDER — LIDOCAINE HYDROCHLORIDE AND EPINEPHRINE 10; 10 MG/ML; UG/ML
INJECTION, SOLUTION INFILTRATION; PERINEURAL
Status: DISCONTINUED | OUTPATIENT
Start: 2024-02-14 | End: 2024-02-14 | Stop reason: HOSPADM

## 2024-02-14 RX ORDER — ONDANSETRON HYDROCHLORIDE 2 MG/ML
INJECTION, SOLUTION INTRAVENOUS
Status: DISCONTINUED | OUTPATIENT
Start: 2024-02-14 | End: 2024-02-14

## 2024-02-14 RX ORDER — HYDROCODONE BITARTRATE AND ACETAMINOPHEN 5; 325 MG/1; MG/1
1 TABLET ORAL EVERY 6 HOURS PRN
Qty: 10 TABLET | Refills: 0 | Status: SHIPPED | OUTPATIENT
Start: 2024-02-14

## 2024-02-14 RX ORDER — HYDROCODONE BITARTRATE AND ACETAMINOPHEN 5; 325 MG/1; MG/1
1 TABLET ORAL EVERY 4 HOURS PRN
Status: DISCONTINUED | OUTPATIENT
Start: 2024-02-14 | End: 2024-02-14 | Stop reason: HOSPADM

## 2024-02-14 RX ORDER — MIDAZOLAM HYDROCHLORIDE 1 MG/ML
INJECTION INTRAMUSCULAR; INTRAVENOUS
Status: DISCONTINUED | OUTPATIENT
Start: 2024-02-14 | End: 2024-02-14

## 2024-02-14 RX ORDER — ONDANSETRON 4 MG/1
8 TABLET, ORALLY DISINTEGRATING ORAL EVERY 8 HOURS PRN
Status: DISCONTINUED | OUTPATIENT
Start: 2024-02-14 | End: 2024-02-14 | Stop reason: HOSPADM

## 2024-02-14 RX ADMIN — PROPOFOL 100 MG: 10 INJECTION, EMULSION INTRAVENOUS at 08:02

## 2024-02-14 RX ADMIN — SODIUM CHLORIDE, POTASSIUM CHLORIDE, SODIUM LACTATE AND CALCIUM CHLORIDE: 600; 310; 30; 20 INJECTION, SOLUTION INTRAVENOUS at 08:02

## 2024-02-14 RX ADMIN — LIDOCAINE HYDROCHLORIDE 20 MG: 10 INJECTION, SOLUTION EPIDURAL; INFILTRATION; INTRACAUDAL; PERINEURAL at 08:02

## 2024-02-14 RX ADMIN — MIDAZOLAM HYDROCHLORIDE 2 MG: 1 INJECTION, SOLUTION INTRAMUSCULAR; INTRAVENOUS at 08:02

## 2024-02-14 RX ADMIN — PROPOFOL 100 MG: 10 INJECTION, EMULSION INTRAVENOUS at 09:02

## 2024-02-14 RX ADMIN — CEFAZOLIN 2 G: 330 INJECTION, POWDER, FOR SOLUTION INTRAMUSCULAR; INTRAVENOUS at 08:02

## 2024-02-14 RX ADMIN — FENTANYL CITRATE 50 MCG: 50 INJECTION INTRAMUSCULAR; INTRAVENOUS at 08:02

## 2024-02-14 RX ADMIN — ONDANSETRON HYDROCHLORIDE 4 MG: 2 SOLUTION INTRAMUSCULAR; INTRAVENOUS at 08:02

## 2024-02-14 NOTE — DISCHARGE INSTRUCTIONS
No Driving today    Keep dressing in place for 2 days. DO NOT GET IT WET    After 2 days you may remove the top bandage and shower only (Leave the steri-strips in place)    No baths, no submerging in water    Apply ice packs to right face in 15 minute intervals each hour over the next 24 hours while awake    Keep your scheduled follow up appointment with Dr. Faith - he will remove your steri-strips at that visit

## 2024-02-14 NOTE — PLAN OF CARE
Tolerating diet and oral liquids without any difficulty. Vital signs stable. Discharge criteria met with Marlena score 10/10. Awaiting MD rounds for discharge. Spouse at bedside.

## 2024-02-14 NOTE — OP NOTE
Procedure date:  02/14/2024     Indications:  63-year-old white female with symptomatic enlarging nodule and subcutaneous tissues of the right face most consistent with a probable sebaceous cyst elected to undergo excision     Preoperative diagnosis:  Symptomatic soft tissue mass of the right face   Postoperative diagnosis:  Symptomatic cyst of the right face     Procedure performed:  Wide excision of right face cyst     Procedure in detail:  Patient was brought to the operative theater laid in a supine position.  Intravenous anesthesia provided.  Preoperative antibiotics administered.  There the face was then sterilely prepped and draped in normal surgical fashion using chlorhexidine.  1% lidocaine with epinephrine infiltrate the subcutaneous tissues overlying this region.  A 15 blade was then used to incise the skin elliptical manner surrounding the palpable mass in the right face.  Tenotomy scissors used to dissect the cyst free of the subcutaneous tissues.  It was extracted in total sent to pathology.  It measured approximately 2 cm in dimension.  The wound was then reapproximated interrupted fashion using 4-0 Monocryl and 5 0 nylon in the skin.  A sterile dressing was then placed upon the wounds.  The patient was then relieved of anesthesia stable condition and transferred to postanesthesia care unit.      Complications:  None  Estimated blood loss:  2 cc   Specimens:  2 cm cyst     Disposition:  Upon recovering from anesthesia patient will be discharged home with a follow up in surgery Clinic in 1 week      Annette Faith MD

## 2024-02-14 NOTE — ANESTHESIA POSTPROCEDURE EVALUATION
Anesthesia Post Evaluation    Patient: Harriet Espinoza    Procedure(s) Performed: Procedure(s) (LRB):  EXCISION, CYST (Right)    Final Anesthesia Type: MAC      Patient location during evaluation: med/surg floor  Patient participation: Yes- Able to Participate  Level of consciousness: awake and alert  Post-procedure vital signs: reviewed and stable  Pain management: adequate  Airway patency: patent    PONV status at discharge: No PONV  Anesthetic complications: no      Cardiovascular status: blood pressure returned to baseline  Respiratory status: unassisted  Hydration status: euvolemic  Follow-up not needed.              Vitals Value Taken Time   /99 02/14/24 0745   Temp 36.6 °C (97.9 °F) 02/14/24 0744   Pulse  02/14/24 0919   Resp 18 02/14/24 0744   SpO2 99 % 02/14/24 0744         No case tracking events are documented in the log.      Pain/Marlena Score: No data recorded

## 2024-02-15 LAB — PSYCHE PATHOLOGY RESULT: NORMAL

## 2024-03-08 ENCOUNTER — LAB VISIT (OUTPATIENT)
Dept: LAB | Facility: HOSPITAL | Age: 64
End: 2024-03-08
Attending: FAMILY MEDICINE
Payer: COMMERCIAL

## 2024-03-08 DIAGNOSIS — Z00.00 ROUTINE GENERAL MEDICAL EXAMINATION AT A HEALTH CARE FACILITY: Primary | ICD-10-CM

## 2024-03-08 LAB
ALBUMIN SERPL-MCNC: 3.8 G/DL (ref 3.4–4.8)
ALBUMIN/GLOB SERPL: 1.3 RATIO (ref 1.1–2)
ALP SERPL-CCNC: 77 UNIT/L (ref 40–150)
ALT SERPL-CCNC: 28 UNIT/L (ref 0–55)
AST SERPL-CCNC: 17 UNIT/L (ref 5–34)
BASOPHILS # BLD AUTO: 0.03 X10(3)/MCL
BASOPHILS NFR BLD AUTO: 0.4 %
BILIRUB SERPL-MCNC: 0.7 MG/DL
BUN SERPL-MCNC: 16 MG/DL (ref 9.8–20.1)
CALCIUM SERPL-MCNC: 9.2 MG/DL (ref 8.4–10.2)
CHLORIDE SERPL-SCNC: 108 MMOL/L (ref 98–107)
CHOLEST SERPL-MCNC: 199 MG/DL
CHOLEST/HDLC SERPL: 4 {RATIO} (ref 0–5)
CO2 SERPL-SCNC: 27 MMOL/L (ref 23–31)
CREAT SERPL-MCNC: 1.12 MG/DL (ref 0.55–1.02)
EOSINOPHIL # BLD AUTO: 0.06 X10(3)/MCL (ref 0–0.9)
EOSINOPHIL NFR BLD AUTO: 0.9 %
ERYTHROCYTE [DISTWIDTH] IN BLOOD BY AUTOMATED COUNT: 12.9 % (ref 11.5–17)
GFR SERPLBLD CREATININE-BSD FMLA CKD-EPI: 55 MLS/MIN/1.73/M2
GLOBULIN SER-MCNC: 3 GM/DL (ref 2.4–3.5)
GLUCOSE SERPL-MCNC: 93 MG/DL (ref 82–115)
HCT VFR BLD AUTO: 43.5 % (ref 37–47)
HDLC SERPL-MCNC: 55 MG/DL (ref 35–60)
HGB BLD-MCNC: 14.9 G/DL (ref 12–16)
IMM GRANULOCYTES # BLD AUTO: 0.02 X10(3)/MCL (ref 0–0.04)
IMM GRANULOCYTES NFR BLD AUTO: 0.3 %
LDLC SERPL CALC-MCNC: 123 MG/DL (ref 50–140)
LYMPHOCYTES # BLD AUTO: 2.37 X10(3)/MCL (ref 0.6–4.6)
LYMPHOCYTES NFR BLD AUTO: 34.2 %
MCH RBC QN AUTO: 33.1 PG (ref 27–31)
MCHC RBC AUTO-ENTMCNC: 34.3 G/DL (ref 33–36)
MCV RBC AUTO: 96.7 FL (ref 80–94)
MONOCYTES # BLD AUTO: 0.61 X10(3)/MCL (ref 0.1–1.3)
MONOCYTES NFR BLD AUTO: 8.8 %
NEUTROPHILS # BLD AUTO: 3.83 X10(3)/MCL (ref 2.1–9.2)
NEUTROPHILS NFR BLD AUTO: 55.4 %
PLATELET # BLD AUTO: 211 X10(3)/MCL (ref 130–400)
PMV BLD AUTO: 10.5 FL (ref 7.4–10.4)
POTASSIUM SERPL-SCNC: 3.7 MMOL/L (ref 3.5–5.1)
PROT SERPL-MCNC: 6.8 GM/DL (ref 5.8–7.6)
RBC # BLD AUTO: 4.5 X10(6)/MCL (ref 4.2–5.4)
SODIUM SERPL-SCNC: 143 MMOL/L (ref 136–145)
TRIGL SERPL-MCNC: 105 MG/DL (ref 37–140)
TSH SERPL-ACNC: 2.08 UIU/ML (ref 0.35–4.94)
VLDLC SERPL CALC-MCNC: 21 MG/DL
WBC # SPEC AUTO: 6.92 X10(3)/MCL (ref 4.5–11.5)

## 2024-03-08 PROCEDURE — 80061 LIPID PANEL: CPT

## 2024-03-08 PROCEDURE — 84443 ASSAY THYROID STIM HORMONE: CPT

## 2024-03-08 PROCEDURE — 80053 COMPREHEN METABOLIC PANEL: CPT

## 2024-03-08 PROCEDURE — 36415 COLL VENOUS BLD VENIPUNCTURE: CPT

## 2024-03-08 PROCEDURE — 85025 COMPLETE CBC W/AUTO DIFF WBC: CPT

## 2024-10-16 ENCOUNTER — LAB VISIT (OUTPATIENT)
Dept: LAB | Facility: HOSPITAL | Age: 64
End: 2024-10-16
Attending: FAMILY MEDICINE
Payer: COMMERCIAL

## 2024-10-16 DIAGNOSIS — E78.2 MIXED HYPERLIPIDEMIA: ICD-10-CM

## 2024-10-16 DIAGNOSIS — I11.9 MALIGNANT HYPERTENSIVE HEART DISEASE WITHOUT HEART FAILURE: Primary | ICD-10-CM

## 2024-10-16 LAB
ALBUMIN SERPL-MCNC: 3.9 G/DL (ref 3.4–4.8)
ALBUMIN/GLOB SERPL: 1.3 RATIO (ref 1.1–2)
ALP SERPL-CCNC: 82 UNIT/L (ref 40–150)
ALT SERPL-CCNC: 24 UNIT/L (ref 0–55)
ANION GAP SERPL CALC-SCNC: 8 MEQ/L
AST SERPL-CCNC: 19 UNIT/L (ref 5–34)
BASOPHILS # BLD AUTO: 0.03 X10(3)/MCL
BASOPHILS NFR BLD AUTO: 0.6 %
BILIRUB SERPL-MCNC: 0.5 MG/DL
BUN SERPL-MCNC: 17 MG/DL (ref 9.8–20.1)
CALCIUM SERPL-MCNC: 9.4 MG/DL (ref 8.4–10.2)
CHLORIDE SERPL-SCNC: 107 MMOL/L (ref 98–107)
CHOLEST SERPL-MCNC: 251 MG/DL
CHOLEST/HDLC SERPL: 5 {RATIO} (ref 0–5)
CO2 SERPL-SCNC: 28 MMOL/L (ref 23–31)
CREAT SERPL-MCNC: 1.3 MG/DL (ref 0.55–1.02)
CREAT/UREA NIT SERPL: 13
EOSINOPHIL # BLD AUTO: 0.08 X10(3)/MCL (ref 0–0.9)
EOSINOPHIL NFR BLD AUTO: 1.7 %
ERYTHROCYTE [DISTWIDTH] IN BLOOD BY AUTOMATED COUNT: 12.6 % (ref 11.5–17)
GFR SERPLBLD CREATININE-BSD FMLA CKD-EPI: 46 ML/MIN/1.73/M2
GLOBULIN SER-MCNC: 3 GM/DL (ref 2.4–3.5)
GLUCOSE SERPL-MCNC: 92 MG/DL (ref 82–115)
HCT VFR BLD AUTO: 40.4 % (ref 37–47)
HDLC SERPL-MCNC: 49 MG/DL (ref 35–60)
HGB BLD-MCNC: 13.9 G/DL (ref 12–16)
IMM GRANULOCYTES # BLD AUTO: 0.01 X10(3)/MCL (ref 0–0.04)
IMM GRANULOCYTES NFR BLD AUTO: 0.2 %
LDLC SERPL CALC-MCNC: 163 MG/DL (ref 50–140)
LYMPHOCYTES # BLD AUTO: 1.92 X10(3)/MCL (ref 0.6–4.6)
LYMPHOCYTES NFR BLD AUTO: 40.8 %
MCH RBC QN AUTO: 33.6 PG (ref 27–31)
MCHC RBC AUTO-ENTMCNC: 34.4 G/DL (ref 33–36)
MCV RBC AUTO: 97.6 FL (ref 80–94)
MONOCYTES # BLD AUTO: 0.44 X10(3)/MCL (ref 0.1–1.3)
MONOCYTES NFR BLD AUTO: 9.3 %
NEUTROPHILS # BLD AUTO: 2.23 X10(3)/MCL (ref 2.1–9.2)
NEUTROPHILS NFR BLD AUTO: 47.4 %
PLATELET # BLD AUTO: 209 X10(3)/MCL (ref 130–400)
PMV BLD AUTO: 9.7 FL (ref 7.4–10.4)
POTASSIUM SERPL-SCNC: 3.7 MMOL/L (ref 3.5–5.1)
PROT SERPL-MCNC: 6.9 GM/DL (ref 5.8–7.6)
RBC # BLD AUTO: 4.14 X10(6)/MCL (ref 4.2–5.4)
SODIUM SERPL-SCNC: 143 MMOL/L (ref 136–145)
TRIGL SERPL-MCNC: 194 MG/DL (ref 37–140)
TSH SERPL-ACNC: 2.38 UIU/ML (ref 0.35–4.94)
VLDLC SERPL CALC-MCNC: 39 MG/DL
WBC # BLD AUTO: 4.71 X10(3)/MCL (ref 4.5–11.5)

## 2024-10-16 PROCEDURE — 80061 LIPID PANEL: CPT

## 2024-10-16 PROCEDURE — 84443 ASSAY THYROID STIM HORMONE: CPT

## 2024-10-16 PROCEDURE — 36415 COLL VENOUS BLD VENIPUNCTURE: CPT

## 2024-10-16 PROCEDURE — 80053 COMPREHEN METABOLIC PANEL: CPT

## 2024-10-16 PROCEDURE — 85025 COMPLETE CBC W/AUTO DIFF WBC: CPT

## 2025-01-13 DIAGNOSIS — M25.521 RIGHT ELBOW PAIN: Primary | ICD-10-CM

## 2025-01-27 ENCOUNTER — HOSPITAL ENCOUNTER (OUTPATIENT)
Dept: RADIOLOGY | Facility: CLINIC | Age: 65
Discharge: HOME | End: 2025-01-27
Attending: REHABILITATION UNIT
Payer: COMMERCIAL

## 2025-01-27 ENCOUNTER — OFFICE VISIT (OUTPATIENT)
Dept: ORTHOPEDICS | Facility: CLINIC | Age: 65
End: 2025-01-27
Payer: COMMERCIAL

## 2025-01-27 VITALS
HEIGHT: 64 IN | HEART RATE: 80 BPM | SYSTOLIC BLOOD PRESSURE: 138 MMHG | BODY MASS INDEX: 30.16 KG/M2 | DIASTOLIC BLOOD PRESSURE: 92 MMHG | WEIGHT: 176.63 LBS

## 2025-01-27 DIAGNOSIS — M25.521 RIGHT ELBOW PAIN: ICD-10-CM

## 2025-01-27 PROCEDURE — 3080F DIAST BP >= 90 MM HG: CPT | Mod: CPTII,,, | Performed by: REHABILITATION UNIT

## 2025-01-27 PROCEDURE — 1159F MED LIST DOCD IN RCRD: CPT | Mod: CPTII,,, | Performed by: REHABILITATION UNIT

## 2025-01-27 PROCEDURE — 3288F FALL RISK ASSESSMENT DOCD: CPT | Mod: CPTII,,, | Performed by: REHABILITATION UNIT

## 2025-01-27 PROCEDURE — 73080 X-RAY EXAM OF ELBOW: CPT | Mod: RT,,, | Performed by: REHABILITATION UNIT

## 2025-01-27 PROCEDURE — 3075F SYST BP GE 130 - 139MM HG: CPT | Mod: CPTII,,, | Performed by: REHABILITATION UNIT

## 2025-01-27 PROCEDURE — 99204 OFFICE O/P NEW MOD 45 MIN: CPT | Mod: ,,, | Performed by: REHABILITATION UNIT

## 2025-01-27 PROCEDURE — 3008F BODY MASS INDEX DOCD: CPT | Mod: CPTII,,, | Performed by: REHABILITATION UNIT

## 2025-01-27 PROCEDURE — 1101F PT FALLS ASSESS-DOCD LE1/YR: CPT | Mod: CPTII,,, | Performed by: REHABILITATION UNIT

## 2025-01-27 RX ORDER — ROSUVASTATIN CALCIUM 20 MG/1
20 TABLET, COATED ORAL NIGHTLY
COMMUNITY
Start: 2024-10-17

## 2025-01-27 RX ORDER — OMEPRAZOLE 40 MG/1
40 CAPSULE, DELAYED RELEASE ORAL
COMMUNITY
Start: 2025-01-26

## 2025-01-27 RX ORDER — CANDESARTAN CILEXETIL AND HYDROCHLOROTHIAZIDE 32; 25 MG/1; MG/1
1 TABLET ORAL
COMMUNITY
Start: 2024-12-14

## 2025-01-27 RX ORDER — COLCHICINE 0.6 MG/1
TABLET ORAL
COMMUNITY
Start: 2025-01-26

## 2025-01-27 RX ORDER — ELUXADOLINE 100 MG/1
TABLET, FILM COATED ORAL
COMMUNITY
Start: 2025-01-05

## 2025-01-27 RX ORDER — TROSPIUM CHLORIDE 20 MG/1
20 TABLET, FILM COATED ORAL 2 TIMES DAILY
COMMUNITY
Start: 2024-10-31

## 2025-01-27 NOTE — PROGRESS NOTES
Subjective:      Patient ID: Harriet Espinoza is a 64 y.o. female.    Chief Complaint: Pain of the Right Elbow (MRI done 2/8/24- Ongoing pain for 2 years due to hitting it on the arm of lawn . Pain radiates into pinky and ring finger.Has swelling in elbow.  IS taking tylenol prn which helps at times for the pain. Has had injection about 3 month ago which has helped but pain has returned. Denies any stiffness, numbness or tingling. )    HPI:   Harriet Espinoza is a 64 y.o. female who presents today for initial evaluation of her RUE    History of Present Illness    CHIEF COMPLAINT:  - Right elbow pain    HPI:  Harriet presents with right elbow pain for two years. Pain is localized to the medial aspect of her right elbow, starting after she hit her elbow on a lawnmower. She reports associated sensory changes in her ring and pinky fingers, describing the sensation as abnormal and difficult to articulate, with a feeling of something on top or a coating. Pain worsens when stretching out her arm, causing pulling and significant discomfort. She is right-handed, which exacerbates the impact on her daily activities.    She has tried several treatments, including cortisone injections, therapy, and needling. Cortisone injections provided temporary relief but did not last. An MRI of the elbow revealed inflammation and swelling where the tendons attach, as well as some inflammation along the bone. She has not had a nerve conduction study performed.    Harriet denies pain anywhere else in the elbow other than the inside. She denies dropping things or weakness in her hand.    PREVIOUS TREATMENTS:  - Cortisone shots: Harriet received injections on the inside of the elbow.  - Physical therapy: Harriet underwent therapy.  - Needling: Harriet underwent this treatment.  - Brace: has not tried     ROS:  Musculoskeletal: +joint pain  Neurological: -weakness          Past Medical History:   Diagnosis Date    Digestive disorder     Gout, unspecified      Hypertension      Past Surgical History:   Procedure Laterality Date    ABDOMINOPLASTY      BACK SURGERY      BUNIONECTOMY Left     CYST REMOVAL Right 02/14/2024    Procedure: EXCISION, CYST;  Surgeon: Annette Faith MD;  Location: [facility];  Service: General;  Laterality: Right;  Excision right face cyst.  Laterality: Right    HYSTERECTOMY      OTHER SURGICAL HISTORY      PARTIAL HYSTERECTOMY      THYROIDECTOMY, PARTIAL Left     TUBAL LIGATION       Social History     Socioeconomic History    Marital status:    Tobacco Use    Smoking status: Never    Smokeless tobacco: Never   Substance and Sexual Activity    Alcohol use: Never    Drug use: Never    Sexual activity: Never         Current Outpatient Medications:     amLODIPine (NORVASC) 2.5 MG tablet, Take 2.5 mg by mouth before evening meal., Disp: , Rfl:     candesartan-hydrochlorothiazid 32-25 mg Tab, Take 1 tablet by mouth., Disp: , Rfl:     carvediloL (COREG) 6.25 MG tablet, Take 6.25 mg by mouth 2 (two) times daily with meals., Disp: , Rfl:     colchicine (COLCRYS) 0.6 mg tablet, Take by mouth., Disp: , Rfl:     omeprazole (PRILOSEC) 40 MG capsule, Take 40 mg by mouth., Disp: , Rfl:     rosuvastatin (CRESTOR) 20 MG tablet, Take 20 mg by mouth every evening., Disp: , Rfl:     trospium (SANCTURA) 20 mg Tab tablet, Take 20 mg by mouth 2 (two) times daily., Disp: , Rfl:     VIBERZI 100 mg Tab, Take by mouth., Disp: , Rfl:     buPROPion (WELLBUTRIN XL) 150 MG TB24 tablet, Take 150 mg by mouth once daily., Disp: , Rfl:     HYDROcodone-acetaminophen (NORCO) 5-325 mg per tablet, Take 1 tablet by mouth every 6 (six) hours as needed for Pain. (Patient not taking: Reported on 1/27/2025), Disp: 10 tablet, Rfl: 0    valsartan-hydrochlorothiazide (DIOVAN-HCT) 320-25 mg per tablet, Take 1 tablet by mouth once daily. (Patient not taking: Reported on 1/27/2025), Disp: , Rfl:   No current facility-administered medications for this visit.    Facility-Administered  "Medications Ordered in Other Visits:     lactated ringers infusion, , Intravenous, Continuous, Juan Patel, CRNA, Last Rate: 125 mL/hr at 02/14/24 0808, New Bag at 02/14/24 0808  Review of patient's allergies indicates:   Allergen Reactions    Solu-medrol [methylprednisolone sodium succ] Rash       BP (!) 138/92   Pulse 80   Ht 5' 4" (1.626 m)   Wt 80.1 kg (176 lb 9.6 oz)   BMI 30.31 kg/m²     Comprehensive review of systems completed and negative except as per HPI.        Objective:   Head: Normocephalic, without obvious abnormality, atraumatic  Eyes: conjunctivae/corneas clear. EOM's intact  Ears: normal external appearance  Nose: Nares normal. Septum midline. Mucosa normal. No drainage  Throat: normal findings: lips normal without lesions  Lungs: unlabored breathing on room air  Chest wall: symmetric chest rise  Heart: regular rate and rhythm  Pulses: 2+ and symmetric  Skin: Skin color, texture, turgor normal. No rashes or lesions  Neurologic: Grossly normal    RUE    Appearance:   Skin is intact with no lesions    Tenderness:   Exquisitely at the medial epicondyle    ROM:   Full motion of the elbow and distally    Positive Tinel's at the elbow with negative at the wrist.  Negative Phalen's and Durkan's.    Pulses: Palpable radial pulse    Neurological deficits: None    The patient has a warm and well-perfused upper extremity with capillary refill less than 2 seconds. Sensation is intact to light touch in terminal nerve distributions. 5/5 ain/pin/uln. The patient has no palpable epitrochlear lymphadenopathy.      Assessment:     Imaging:   Radiographs of the elbow show no bony abnormality    Narrative & Impression  EXAMINATION:  MRI ELBOW WITHOUT CONTRAST RIGHT     CLINICAL HISTORY:  m25.521;  Pain in right elbow     TECHNIQUE:  Unenhanced, multiplanar, multisequence MR imaging of the right elbow was obtained.     COMPARISON:  Radiographs right elbow used for comparison dated 06/19/2023   "   FINDINGS:  Subtle trabecular edema is present to the medial epicondyle without visible fracture.  No elbow joint effusion or loose body.  Subtle asymmetry noted to the radiocapitellar joint space.     The biceps, brachialis, and triceps tendons are intact.  The common flexor tendon is acutely inflamed but does not appear discretely torn.  The common extensor tendon is intact.  A partial-thickness tear is present to the origin of the radial collateral ligament on image 10 of series 9 and is age indeterminate.  The ulnar collateral ligament complex is intact.  Annular ligament is intact.  Lateral ulnar collateral ligament is intact.  The included muscles are intact.  The included portion of the ulnar nerve is normal in course and caliber.  Subcutaneous edema is present to the posterior aspect of the medial epicondyle.     Impression:     Subcutaneous edema is present to the posterior aspect of the medial epicondyle along with a subtle bone contusion.  No discrete fracture identified.  The ulnar nerve remains normal in course and caliber.     Tendinitis to the common flexor tendon origin without organized tear.     A low to intermediate grade partial-thickness tear is present to the origin of the radial collateral ligament and does not appear acute.  Please correlate this with point tenderness        Electronically signed by:David Christine  Date:                                            02/08/2024  Time:                                           16:34    MRI shows edema to the medial epicondyle as well as the subcutaneous tissue posteriorly., and flexor tendon origin inflammation.  No tear.  Degenerative changes of the LCL        1. Right elbow pain          Plan:       Orders Placed This Encounter    X-Ray Elbow Complete 3 views Right    Ambulatory referral/consult to Neurology     Imaging and exam findings discussed.  She has a medial elbow pain as well as sensory changes within the ulnar nerve distribution.   EMG/NCS were to determine severity of compression.  Discussed possible nighttime extension brace versus surgical release pending test results.  Avoid aggravating activities.  Symptomatic management.  Follow up after testing is completed to discuss results and treatment plan. All questions were answered. Patient happy and in agreement with the plan.     This note was generated with the assistance of ambient listening technology. Verbal consent was obtained by the patient and accompanying visitor(s) for the recording of patient appointment to facilitate this note. I attest to having reviewed and edited the generated note for accuracy, though some syntax or spelling errors may persist. Please contact the author of this note for any clarification.

## 2025-02-10 ENCOUNTER — OFFICE VISIT (OUTPATIENT)
Dept: ORTHOPEDICS | Facility: CLINIC | Age: 65
End: 2025-02-10
Payer: COMMERCIAL

## 2025-02-10 VITALS
BODY MASS INDEX: 30.14 KG/M2 | HEIGHT: 64 IN | DIASTOLIC BLOOD PRESSURE: 118 MMHG | WEIGHT: 176.56 LBS | SYSTOLIC BLOOD PRESSURE: 170 MMHG | HEART RATE: 73 BPM

## 2025-02-10 DIAGNOSIS — G56.22 CUBITAL TUNNEL SYNDROME, LEFT: ICD-10-CM

## 2025-02-10 DIAGNOSIS — G56.01 RIGHT CARPAL TUNNEL SYNDROME: Primary | ICD-10-CM

## 2025-02-10 PROCEDURE — 3288F FALL RISK ASSESSMENT DOCD: CPT | Mod: CPTII,,, | Performed by: REHABILITATION UNIT

## 2025-02-10 PROCEDURE — 3080F DIAST BP >= 90 MM HG: CPT | Mod: CPTII,,, | Performed by: REHABILITATION UNIT

## 2025-02-10 PROCEDURE — 1159F MED LIST DOCD IN RCRD: CPT | Mod: CPTII,,, | Performed by: REHABILITATION UNIT

## 2025-02-10 PROCEDURE — 1101F PT FALLS ASSESS-DOCD LE1/YR: CPT | Mod: CPTII,,, | Performed by: REHABILITATION UNIT

## 2025-02-10 PROCEDURE — 3077F SYST BP >= 140 MM HG: CPT | Mod: CPTII,,, | Performed by: REHABILITATION UNIT

## 2025-02-10 PROCEDURE — 3008F BODY MASS INDEX DOCD: CPT | Mod: CPTII,,, | Performed by: REHABILITATION UNIT

## 2025-02-10 PROCEDURE — 99213 OFFICE O/P EST LOW 20 MIN: CPT | Mod: ,,, | Performed by: REHABILITATION UNIT

## 2025-02-10 NOTE — PROGRESS NOTES
Subjective:      Patient ID: Harriet Espinoza is a 64 y.o. female.    Chief Complaint: No chief complaint on file.    HPI:   Harriet sEpinoza is a 64 y.o. female who presents today for initial evaluation of her RUE. Patient is here today for EMG results. Patient states she is still experiencing right elbow pain.  She localizes well medially.  No issues with the left upper extremity.    History of Present Illness    CHIEF COMPLAINT:  - Right elbow pain    Initial HPI:  Harriet presents with right elbow pain for two years. Pain is localized to the medial aspect of her right elbow, starting after she hit her elbow on a lawnmower. She reports associated sensory changes in her ring and pinky fingers, describing the sensation as abnormal and difficult to articulate, with a feeling of something on top or a coating. Pain worsens when stretching out her arm, causing pulling and significant discomfort. She is right-handed, which exacerbates the impact on her daily activities.    She has tried several treatments, including cortisone injections, therapy, and needling. Cortisone injections provided temporary relief but did not last. An MRI of the elbow revealed inflammation and swelling where the tendons attach, as well as some inflammation along the bone. She has not had a nerve conduction study performed.    Harriet denies pain anywhere else in the elbow other than the inside. She denies dropping things or weakness in her hand.    PREVIOUS TREATMENTS:  - Cortisone shots: Harriet received injections on the inside of the elbow.  - Physical therapy: Harriet underwent therapy.  - Needling: Harriet underwent this treatment.  - Brace: has not tried     ROS:  Musculoskeletal: +joint pain  Neurological: -weakness          Past Medical History:   Diagnosis Date    Digestive disorder     Gout, unspecified     Hypertension      Past Surgical History:   Procedure Laterality Date    ABDOMINOPLASTY      BACK SURGERY      BUNIONECTOMY Left     CYST REMOVAL  Right 02/14/2024    Procedure: EXCISION, CYST;  Surgeon: Annette Faith MD;  Location: [facility];  Service: General;  Laterality: Right;  Excision right face cyst.  Laterality: Right    HYSTERECTOMY      OTHER SURGICAL HISTORY      PARTIAL HYSTERECTOMY      THYROIDECTOMY, PARTIAL Left     TUBAL LIGATION       Social History     Socioeconomic History    Marital status:    Tobacco Use    Smoking status: Never    Smokeless tobacco: Never   Substance and Sexual Activity    Alcohol use: Never    Drug use: Never    Sexual activity: Never         Current Outpatient Medications:     amLODIPine (NORVASC) 2.5 MG tablet, Take 2.5 mg by mouth before evening meal., Disp: , Rfl:     buPROPion (WELLBUTRIN XL) 150 MG TB24 tablet, Take 150 mg by mouth once daily., Disp: , Rfl:     candesartan-hydrochlorothiazid 32-25 mg Tab, Take 1 tablet by mouth., Disp: , Rfl:     carvediloL (COREG) 6.25 MG tablet, Take 6.25 mg by mouth 2 (two) times daily with meals., Disp: , Rfl:     colchicine (COLCRYS) 0.6 mg tablet, Take by mouth., Disp: , Rfl:     HYDROcodone-acetaminophen (NORCO) 5-325 mg per tablet, Take 1 tablet by mouth every 6 (six) hours as needed for Pain. (Patient not taking: Reported on 1/27/2025), Disp: 10 tablet, Rfl: 0    omeprazole (PRILOSEC) 40 MG capsule, Take 40 mg by mouth., Disp: , Rfl:     rosuvastatin (CRESTOR) 20 MG tablet, Take 20 mg by mouth every evening., Disp: , Rfl:     trospium (SANCTURA) 20 mg Tab tablet, Take 20 mg by mouth 2 (two) times daily., Disp: , Rfl:     valsartan-hydrochlorothiazide (DIOVAN-HCT) 320-25 mg per tablet, Take 1 tablet by mouth once daily. (Patient not taking: Reported on 1/27/2025), Disp: , Rfl:     VIBERZI 100 mg Tab, Take by mouth., Disp: , Rfl:   No current facility-administered medications for this visit.    Facility-Administered Medications Ordered in Other Visits:     lactated ringers infusion, , Intravenous, Continuous, Juan Patel, CRNA, Last Rate: 125 mL/hr at  02/14/24 0808, New Bag at 02/14/24 0808  Review of patient's allergies indicates:   Allergen Reactions    Solu-medrol [methylprednisolone sodium succ] Rash       There were no vitals taken for this visit.    Comprehensive review of systems completed and negative except as per HPI.        Objective:   Head: Normocephalic, without obvious abnormality, atraumatic  Eyes: conjunctivae/corneas clear. EOM's intact  Ears: normal external appearance  Nose: Nares normal. Septum midline. Mucosa normal. No drainage  Throat: normal findings: lips normal without lesions  Lungs: unlabored breathing on room air  Chest wall: symmetric chest rise  Heart: regular rate and rhythm  Pulses: 2+ and symmetric  Skin: Skin color, texture, turgor normal. No rashes or lesions  Neurologic: Grossly normal    RUE    Appearance:   Skin is intact with no lesions    Tenderness:   Exquisitely at the medial epicondyle    ROM:   Full motion of the elbow and distally    Positive Tinel's at the elbow with negative at the wrist.  Negative Phalen's and Durkan's.    Pulses: Palpable radial pulse    Neurological deficits: None    The patient has a warm and well-perfused upper extremity with capillary refill less than 2 seconds. Sensation is intact to light touch in terminal nerve distributions. 5/5 ain/pin/uln. The patient has no palpable epitrochlear lymphadenopathy.      Assessment:     Imaging:   Radiographs of the elbow show no bony abnormality    Narrative & Impression  EXAMINATION:  MRI ELBOW WITHOUT CONTRAST RIGHT     CLINICAL HISTORY:  m25.521;  Pain in right elbow     TECHNIQUE:  Unenhanced, multiplanar, multisequence MR imaging of the right elbow was obtained.     COMPARISON:  Radiographs right elbow used for comparison dated 06/19/2023     FINDINGS:  Subtle trabecular edema is present to the medial epicondyle without visible fracture.  No elbow joint effusion or loose body.  Subtle asymmetry noted to the radiocapitellar joint space.     The  biceps, brachialis, and triceps tendons are intact.  The common flexor tendon is acutely inflamed but does not appear discretely torn.  The common extensor tendon is intact.  A partial-thickness tear is present to the origin of the radial collateral ligament on image 10 of series 9 and is age indeterminate.  The ulnar collateral ligament complex is intact.  Annular ligament is intact.  Lateral ulnar collateral ligament is intact.  The included muscles are intact.  The included portion of the ulnar nerve is normal in course and caliber.  Subcutaneous edema is present to the posterior aspect of the medial epicondyle.     Impression:     Subcutaneous edema is present to the posterior aspect of the medial epicondyle along with a subtle bone contusion.  No discrete fracture identified.  The ulnar nerve remains normal in course and caliber.     Tendinitis to the common flexor tendon origin without organized tear.     A low to intermediate grade partial-thickness tear is present to the origin of the radial collateral ligament and does not appear acute.  Please correlate this with point tenderness        Electronically signed by:David Christine  Date:                                            02/08/2024  Time:                                           16:34    MRI shows edema to the medial epicondyle as well as the subcutaneous tissue posteriorly., and flexor tendon origin inflammation.  No tear.  Degenerative changes of the LCL          EMG shows mild right ulnar neuropathy at elbow and mild carpal tunnel    1. Right carpal tunnel syndrome    2. Cubital tunnel syndrome, left            Plan:            Imaging and exam findings discussed.  She has a medial elbow pain as well as sensory changes within the ulnar nerve distribution.  Initially there was a discrepancy in the EMG report.  However, I have discussed with a provider and it did show right ulnar neuropathy at the elbow.  Due to the longevity of the symptoms and her  sensory changes on exam I have recommended cubital tunnel release.  Patient will call when she is ready for surgery. Avoid aggravating activities.  Symptomatic management. All questions were answered. Patient happy and in agreement with the plan.     Colin Presley MD personally performed the services described in this documentation, including but not limited to patient's history, physical examination, and assessment and plan of care. All medical record entries made by Geetha Kauffman PA-C were performed at his direction and in his presence. The medical record was reviewed and is accurate and complete.

## 2025-03-31 ENCOUNTER — TELEPHONE (OUTPATIENT)
Dept: ORTHOPEDICS | Facility: CLINIC | Age: 65
End: 2025-03-31
Payer: COMMERCIAL

## 2025-04-01 ENCOUNTER — TELEPHONE (OUTPATIENT)
Dept: ORTHOPEDICS | Facility: CLINIC | Age: 65
End: 2025-04-01
Payer: COMMERCIAL

## 2025-04-01 NOTE — TELEPHONE ENCOUNTER
Pt jayjay asking for a return call, stated she had different insurance from her last visit.     Spoke with patient, she stated she has medicare and a physicians mutual for a supplement. I let her know that she was in network with our office.

## 2025-04-08 ENCOUNTER — OFFICE VISIT (OUTPATIENT)
Dept: ORTHOPEDICS | Facility: CLINIC | Age: 65
End: 2025-04-08
Payer: MEDICARE

## 2025-04-08 ENCOUNTER — CLINICAL SUPPORT (OUTPATIENT)
Dept: LAB | Facility: HOSPITAL | Age: 65
End: 2025-04-08
Payer: MEDICARE

## 2025-04-08 ENCOUNTER — HOSPITAL ENCOUNTER (OUTPATIENT)
Dept: RADIOLOGY | Facility: HOSPITAL | Age: 65
Discharge: HOME | End: 2025-04-08
Payer: MEDICARE

## 2025-04-08 VITALS
HEIGHT: 64 IN | DIASTOLIC BLOOD PRESSURE: 103 MMHG | SYSTOLIC BLOOD PRESSURE: 160 MMHG | HEART RATE: 73 BPM | WEIGHT: 176.56 LBS | BODY MASS INDEX: 30.14 KG/M2

## 2025-04-08 DIAGNOSIS — G56.21 CUBITAL TUNNEL SYNDROME ON RIGHT: ICD-10-CM

## 2025-04-08 DIAGNOSIS — G56.21 CUBITAL TUNNEL SYNDROME ON RIGHT: Primary | ICD-10-CM

## 2025-04-08 LAB
ALBUMIN SERPL-MCNC: 4.1 G/DL (ref 3.4–4.8)
ALBUMIN/GLOB SERPL: 1.3 RATIO (ref 1.1–2)
ALP SERPL-CCNC: 78 UNIT/L (ref 40–150)
ALT SERPL-CCNC: 19 UNIT/L (ref 0–55)
ANION GAP SERPL CALC-SCNC: 6 MEQ/L
AST SERPL-CCNC: 19 UNIT/L (ref 11–45)
BASOPHILS # BLD AUTO: 0.03 X10(3)/MCL
BASOPHILS NFR BLD AUTO: 0.5 %
BILIRUB SERPL-MCNC: 0.5 MG/DL
BUN SERPL-MCNC: 20.7 MG/DL (ref 9.8–20.1)
CALCIUM SERPL-MCNC: 9.6 MG/DL (ref 8.4–10.2)
CHLORIDE SERPL-SCNC: 108 MMOL/L (ref 98–107)
CO2 SERPL-SCNC: 27 MMOL/L (ref 23–31)
CREAT SERPL-MCNC: 1.13 MG/DL (ref 0.55–1.02)
CREAT/UREA NIT SERPL: 18
EOSINOPHIL # BLD AUTO: 0.09 X10(3)/MCL (ref 0–0.9)
EOSINOPHIL NFR BLD AUTO: 1.5 %
ERYTHROCYTE [DISTWIDTH] IN BLOOD BY AUTOMATED COUNT: 12.8 % (ref 11.5–17)
GFR SERPLBLD CREATININE-BSD FMLA CKD-EPI: 54 ML/MIN/1.73/M2
GLOBULIN SER-MCNC: 3.2 GM/DL (ref 2.4–3.5)
GLUCOSE SERPL-MCNC: 99 MG/DL (ref 82–115)
HCT VFR BLD AUTO: 41.2 % (ref 37–47)
HGB BLD-MCNC: 14.1 G/DL (ref 12–16)
IMM GRANULOCYTES # BLD AUTO: 0.02 X10(3)/MCL (ref 0–0.04)
IMM GRANULOCYTES NFR BLD AUTO: 0.3 %
LYMPHOCYTES # BLD AUTO: 2.47 X10(3)/MCL (ref 0.6–4.6)
LYMPHOCYTES NFR BLD AUTO: 40.4 %
MCH RBC QN AUTO: 33.6 PG (ref 27–31)
MCHC RBC AUTO-ENTMCNC: 34.2 G/DL (ref 33–36)
MCV RBC AUTO: 98.1 FL (ref 80–94)
MONOCYTES # BLD AUTO: 0.55 X10(3)/MCL (ref 0.1–1.3)
MONOCYTES NFR BLD AUTO: 9 %
NEUTROPHILS # BLD AUTO: 2.95 X10(3)/MCL (ref 2.1–9.2)
NEUTROPHILS NFR BLD AUTO: 48.3 %
NRBC BLD AUTO-RTO: 0 %
OHS QRS DURATION: 86 MS
OHS QTC CALCULATION: 440 MS
PLATELET # BLD AUTO: 200 X10(3)/MCL (ref 130–400)
PMV BLD AUTO: 10 FL (ref 7.4–10.4)
POTASSIUM SERPL-SCNC: 4 MMOL/L (ref 3.5–5.1)
PROT SERPL-MCNC: 7.3 GM/DL (ref 5.8–7.6)
RBC # BLD AUTO: 4.2 X10(6)/MCL (ref 4.2–5.4)
SODIUM SERPL-SCNC: 141 MMOL/L (ref 136–145)
WBC # BLD AUTO: 6.11 X10(3)/MCL (ref 4.5–11.5)

## 2025-04-08 PROCEDURE — 80053 COMPREHEN METABOLIC PANEL: CPT

## 2025-04-08 PROCEDURE — 99214 OFFICE O/P EST MOD 30 MIN: CPT | Mod: ,,, | Performed by: REHABILITATION UNIT

## 2025-04-08 PROCEDURE — 71046 X-RAY EXAM CHEST 2 VIEWS: CPT | Mod: TC

## 2025-04-08 PROCEDURE — 85025 COMPLETE CBC W/AUTO DIFF WBC: CPT

## 2025-04-08 PROCEDURE — 93005 ELECTROCARDIOGRAM TRACING: CPT

## 2025-04-08 PROCEDURE — 93010 ELECTROCARDIOGRAM REPORT: CPT | Mod: ,,, | Performed by: INTERNAL MEDICINE

## 2025-04-08 PROCEDURE — 36415 COLL VENOUS BLD VENIPUNCTURE: CPT

## 2025-04-08 RX ORDER — SODIUM CHLORIDE 9 MG/ML
INJECTION, SOLUTION INTRAVENOUS CONTINUOUS
OUTPATIENT
Start: 2025-04-08

## 2025-04-08 NOTE — H&P (VIEW-ONLY)
Subjective:      Patient ID: Harriet Espinoza is a 65 y.o. female.    Chief Complaint: Pre-op Exam (Patient is here for pre-op on Right CBTR sx 4/24/25. )    HPI:   Harriet Espinoza is a 65 y.o. female who presents today for pre-op examination for right cubital tunnel release.     History of Present Illness    CHIEF COMPLAINT:  - Right elbow pain    Patient continues to have medial elbow discomfort with sensory changes into the ulnar nerve distribution of her hand and fingers.  He is interested in proceeding with surgery.  She denies any median nerve issues or complaints.  No significant changes since she was last seen.    Initial HPI:  Harriet presents with right elbow pain for two years. Pain is localized to the medial aspect of her right elbow, starting after she hit her elbow on a lawnmower. She reports associated sensory changes in her ring and pinky fingers, describing the sensation as abnormal and difficult to articulate, with a feeling of something on top or a coating. Pain worsens when stretching out her arm, causing pulling and significant discomfort. She is right-handed, which exacerbates the impact on her daily activities.    She has tried several treatments, including cortisone injections, therapy, and needling. Cortisone injections provided temporary relief but did not last. An MRI of the elbow revealed inflammation and swelling where the tendons attach, as well as some inflammation along the bone. She has not had a nerve conduction study performed.    Harriet denies pain anywhere else in the elbow other than the inside. She denies dropping things or weakness in her hand.    PREVIOUS TREATMENTS:  - Cortisone shots: Harriet received injections on the inside of the elbow.  - Physical therapy: Harriet underwent therapy.  - Needling: Harriet underwent this treatment.  - Brace: has not tried     ROS:  Musculoskeletal: +joint pain  Neurological: -weakness          Past Medical History:   Diagnosis Date    Digestive disorder      Gout, unspecified     Hypertension      Past Surgical History:   Procedure Laterality Date    ABDOMINOPLASTY      BACK SURGERY      BUNIONECTOMY Left     CYST REMOVAL Right 02/14/2024    Procedure: EXCISION, CYST;  Surgeon: Annette Faith MD;  Location: [facility];  Service: General;  Laterality: Right;  Excision right face cyst.  Laterality: Right    HYSTERECTOMY      OTHER SURGICAL HISTORY      PARTIAL HYSTERECTOMY      THYROIDECTOMY, PARTIAL Left     TUBAL LIGATION       Social History     Socioeconomic History    Marital status:    Tobacco Use    Smoking status: Never    Smokeless tobacco: Never   Substance and Sexual Activity    Alcohol use: Never    Drug use: Never    Sexual activity: Never         Current Outpatient Medications:     candesartan-hydrochlorothiazid 32-25 mg Tab, Take 1 tablet by mouth., Disp: , Rfl:     carvediloL (COREG) 6.25 MG tablet, Take 6.25 mg by mouth 2 (two) times daily with meals., Disp: , Rfl:     colchicine (COLCRYS) 0.6 mg tablet, Take by mouth., Disp: , Rfl:     omeprazole (PRILOSEC) 40 MG capsule, Take 40 mg by mouth., Disp: , Rfl:     rosuvastatin (CRESTOR) 20 MG tablet, Take 20 mg by mouth every evening., Disp: , Rfl:     trospium (SANCTURA) 20 mg Tab tablet, Take 20 mg by mouth 2 (two) times daily., Disp: , Rfl:     valsartan-hydrochlorothiazide (DIOVAN-HCT) 320-25 mg per tablet, Take 1 tablet by mouth once daily., Disp: , Rfl:     VIBERZI 100 mg Tab, Take by mouth., Disp: , Rfl:     amLODIPine (NORVASC) 2.5 MG tablet, Take 2.5 mg by mouth before evening meal., Disp: , Rfl:     buPROPion (WELLBUTRIN XL) 150 MG TB24 tablet, Take 150 mg by mouth once daily., Disp: , Rfl:     HYDROcodone-acetaminophen (NORCO) 5-325 mg per tablet, Take 1 tablet by mouth every 6 (six) hours as needed for Pain. (Patient not taking: Reported on 1/27/2025), Disp: 10 tablet, Rfl: 0  No current facility-administered medications for this visit.    Facility-Administered Medications Ordered in  "Other Visits:     lactated ringers infusion, , Intravenous, Continuous, Juan Patel, CRNA, Last Rate: 125 mL/hr at 02/14/24 0808, New Bag at 02/14/24 0808  Review of patient's allergies indicates:   Allergen Reactions    Solu-medrol [methylprednisolone sodium succ] Rash       BP (!) 165/103   Pulse 79   Ht 5' 4" (1.626 m)   Wt 80.1 kg (176 lb 9.4 oz)   BMI 30.31 kg/m²     Comprehensive review of systems completed and negative except as per HPI.        Objective:   Head: Normocephalic, without obvious abnormality, atraumatic  Eyes: conjunctivae/corneas clear. EOM's intact  Ears: normal external appearance  Nose: Nares normal. Septum midline. Mucosa normal. No drainage  Throat: normal findings: lips normal without lesions  Lungs: unlabored breathing on room air  Chest wall: symmetric chest rise  Heart: regular rate and rhythm  Pulses: 2+ and symmetric  Skin: Skin color, texture, turgor normal. No rashes or lesions  Neurologic: Grossly normal    RUE    Appearance:   Skin is intact with no lesions    Tenderness:   Exquisitely at the medial epicondyle    ROM:   Full motion of the elbow and distally    Positive Tinel's at the elbow with negative at the wrist.  Negative Phalen's and Durkan's.    Pulses: Palpable radial pulse    Neurological deficits: None    The patient has a warm and well-perfused upper extremity with capillary refill less than 2 seconds. Sensation is intact to light touch in terminal nerve distributions. 5/5 ain/pin/uln. The patient has no palpable epitrochlear lymphadenopathy.      Assessment:     Imaging:   Radiographs of the elbow show no bony abnormality    Narrative & Impression  EXAMINATION:  MRI ELBOW WITHOUT CONTRAST RIGHT     CLINICAL HISTORY:  m25.521;  Pain in right elbow     TECHNIQUE:  Unenhanced, multiplanar, multisequence MR imaging of the right elbow was obtained.     COMPARISON:  Radiographs right elbow used for comparison dated 06/19/2023     FINDINGS:  Subtle trabecular " edema is present to the medial epicondyle without visible fracture.  No elbow joint effusion or loose body.  Subtle asymmetry noted to the radiocapitellar joint space.     The biceps, brachialis, and triceps tendons are intact.  The common flexor tendon is acutely inflamed but does not appear discretely torn.  The common extensor tendon is intact.  A partial-thickness tear is present to the origin of the radial collateral ligament on image 10 of series 9 and is age indeterminate.  The ulnar collateral ligament complex is intact.  Annular ligament is intact.  Lateral ulnar collateral ligament is intact.  The included muscles are intact.  The included portion of the ulnar nerve is normal in course and caliber.  Subcutaneous edema is present to the posterior aspect of the medial epicondyle.     Impression:     Subcutaneous edema is present to the posterior aspect of the medial epicondyle along with a subtle bone contusion.  No discrete fracture identified.  The ulnar nerve remains normal in course and caliber.     Tendinitis to the common flexor tendon origin without organized tear.     A low to intermediate grade partial-thickness tear is present to the origin of the radial collateral ligament and does not appear acute.  Please correlate this with point tenderness        Electronically signed by:David Christine  Date:                                            02/08/2024  Time:                                           16:34    MRI shows edema to the medial epicondyle as well as the subcutaneous tissue posteriorly., and flexor tendon origin inflammation.  No tear.  Degenerative changes of the LCL          EMG shows mild right ulnar neuropathy at elbow and mild carpal tunnel    1. Cubital tunnel syndrome on right            Plan:       Orders Placed This Encounter    X-Ray Chest PA And Lateral    CBC auto differential    Comprehensive metabolic panel    EKG 12-lead    Case Request Operating Room: RELEASE, CUBITAL TUNNEL        Imaging and exam findings discussed.  She has a medial elbow pain as well as sensory changes within the ulnar nerve distribution.  EMG shows right ulnar neuropathy at the elbow.  She has no carpal tunnel complaints or positive testing on exam.  Due to the longevity of the symptoms and her sensory changes on exam I have recommended cubital tunnel release.  We discussed operative and nonoperative options. The patient had an opportunity to ask questions. All questions were answered. The risks and benefits of surgery were discussed with the patient, including but not limited to bleeding, infection, damage to surrounding nerves and structures, need for further surgery, repair failure, anesthesia risk, progression of arthritis, blood clots, and medical risks of surgery. The patient voiced understanding of the risks and benefits and provided written consent to the procedure. Plan for right cubital tunnel release on 4/24/2025.      Colin Presley MD personally performed the services described in this documentation, including but not limited to patient's history, physical examination, and assessment and plan of care. All medical record entries made by Geetha Kauffman PA-C were performed at his direction and in his presence. The medical record was reviewed and is accurate and complete.

## 2025-04-08 NOTE — PROGRESS NOTES
Subjective:      Patient ID: Harriet Espinoza is a 65 y.o. female.    Chief Complaint: Pre-op Exam (Patient is here for pre-op on Right CBTR sx 4/24/25. )    HPI:   Harriet Espinoza is a 65 y.o. female who presents today for pre-op examination for right cubital tunnel release.     History of Present Illness    CHIEF COMPLAINT:  - Right elbow pain    Patient continues to have medial elbow discomfort with sensory changes into the ulnar nerve distribution of her hand and fingers.  He is interested in proceeding with surgery.  She denies any median nerve issues or complaints.  No significant changes since she was last seen.    Initial HPI:  Harriet presents with right elbow pain for two years. Pain is localized to the medial aspect of her right elbow, starting after she hit her elbow on a lawnmower. She reports associated sensory changes in her ring and pinky fingers, describing the sensation as abnormal and difficult to articulate, with a feeling of something on top or a coating. Pain worsens when stretching out her arm, causing pulling and significant discomfort. She is right-handed, which exacerbates the impact on her daily activities.    She has tried several treatments, including cortisone injections, therapy, and needling. Cortisone injections provided temporary relief but did not last. An MRI of the elbow revealed inflammation and swelling where the tendons attach, as well as some inflammation along the bone. She has not had a nerve conduction study performed.    Harriet denies pain anywhere else in the elbow other than the inside. She denies dropping things or weakness in her hand.    PREVIOUS TREATMENTS:  - Cortisone shots: Harriet received injections on the inside of the elbow.  - Physical therapy: Harriet underwent therapy.  - Needling: Harriet underwent this treatment.  - Brace: has not tried     ROS:  Musculoskeletal: +joint pain  Neurological: -weakness          Past Medical History:   Diagnosis Date    Digestive disorder      Gout, unspecified     Hypertension      Past Surgical History:   Procedure Laterality Date    ABDOMINOPLASTY      BACK SURGERY      BUNIONECTOMY Left     CYST REMOVAL Right 02/14/2024    Procedure: EXCISION, CYST;  Surgeon: Annette Faith MD;  Location: [facility];  Service: General;  Laterality: Right;  Excision right face cyst.  Laterality: Right    HYSTERECTOMY      OTHER SURGICAL HISTORY      PARTIAL HYSTERECTOMY      THYROIDECTOMY, PARTIAL Left     TUBAL LIGATION       Social History     Socioeconomic History    Marital status:    Tobacco Use    Smoking status: Never    Smokeless tobacco: Never   Substance and Sexual Activity    Alcohol use: Never    Drug use: Never    Sexual activity: Never         Current Outpatient Medications:     candesartan-hydrochlorothiazid 32-25 mg Tab, Take 1 tablet by mouth., Disp: , Rfl:     carvediloL (COREG) 6.25 MG tablet, Take 6.25 mg by mouth 2 (two) times daily with meals., Disp: , Rfl:     colchicine (COLCRYS) 0.6 mg tablet, Take by mouth., Disp: , Rfl:     omeprazole (PRILOSEC) 40 MG capsule, Take 40 mg by mouth., Disp: , Rfl:     rosuvastatin (CRESTOR) 20 MG tablet, Take 20 mg by mouth every evening., Disp: , Rfl:     trospium (SANCTURA) 20 mg Tab tablet, Take 20 mg by mouth 2 (two) times daily., Disp: , Rfl:     valsartan-hydrochlorothiazide (DIOVAN-HCT) 320-25 mg per tablet, Take 1 tablet by mouth once daily., Disp: , Rfl:     VIBERZI 100 mg Tab, Take by mouth., Disp: , Rfl:     amLODIPine (NORVASC) 2.5 MG tablet, Take 2.5 mg by mouth before evening meal., Disp: , Rfl:     buPROPion (WELLBUTRIN XL) 150 MG TB24 tablet, Take 150 mg by mouth once daily., Disp: , Rfl:     HYDROcodone-acetaminophen (NORCO) 5-325 mg per tablet, Take 1 tablet by mouth every 6 (six) hours as needed for Pain. (Patient not taking: Reported on 1/27/2025), Disp: 10 tablet, Rfl: 0  No current facility-administered medications for this visit.    Facility-Administered Medications Ordered in  "Other Visits:     lactated ringers infusion, , Intravenous, Continuous, Juan Patel, CRNA, Last Rate: 125 mL/hr at 02/14/24 0808, New Bag at 02/14/24 0808  Review of patient's allergies indicates:   Allergen Reactions    Solu-medrol [methylprednisolone sodium succ] Rash       BP (!) 165/103   Pulse 79   Ht 5' 4" (1.626 m)   Wt 80.1 kg (176 lb 9.4 oz)   BMI 30.31 kg/m²     Comprehensive review of systems completed and negative except as per HPI.        Objective:   Head: Normocephalic, without obvious abnormality, atraumatic  Eyes: conjunctivae/corneas clear. EOM's intact  Ears: normal external appearance  Nose: Nares normal. Septum midline. Mucosa normal. No drainage  Throat: normal findings: lips normal without lesions  Lungs: unlabored breathing on room air  Chest wall: symmetric chest rise  Heart: regular rate and rhythm  Pulses: 2+ and symmetric  Skin: Skin color, texture, turgor normal. No rashes or lesions  Neurologic: Grossly normal    RUE    Appearance:   Skin is intact with no lesions    Tenderness:   Exquisitely at the medial epicondyle    ROM:   Full motion of the elbow and distally    Positive Tinel's at the elbow with negative at the wrist.  Negative Phalen's and Durkan's.    Pulses: Palpable radial pulse    Neurological deficits: None    The patient has a warm and well-perfused upper extremity with capillary refill less than 2 seconds. Sensation is intact to light touch in terminal nerve distributions. 5/5 ain/pin/uln. The patient has no palpable epitrochlear lymphadenopathy.      Assessment:     Imaging:   Radiographs of the elbow show no bony abnormality    Narrative & Impression  EXAMINATION:  MRI ELBOW WITHOUT CONTRAST RIGHT     CLINICAL HISTORY:  m25.521;  Pain in right elbow     TECHNIQUE:  Unenhanced, multiplanar, multisequence MR imaging of the right elbow was obtained.     COMPARISON:  Radiographs right elbow used for comparison dated 06/19/2023     FINDINGS:  Subtle trabecular " edema is present to the medial epicondyle without visible fracture.  No elbow joint effusion or loose body.  Subtle asymmetry noted to the radiocapitellar joint space.     The biceps, brachialis, and triceps tendons are intact.  The common flexor tendon is acutely inflamed but does not appear discretely torn.  The common extensor tendon is intact.  A partial-thickness tear is present to the origin of the radial collateral ligament on image 10 of series 9 and is age indeterminate.  The ulnar collateral ligament complex is intact.  Annular ligament is intact.  Lateral ulnar collateral ligament is intact.  The included muscles are intact.  The included portion of the ulnar nerve is normal in course and caliber.  Subcutaneous edema is present to the posterior aspect of the medial epicondyle.     Impression:     Subcutaneous edema is present to the posterior aspect of the medial epicondyle along with a subtle bone contusion.  No discrete fracture identified.  The ulnar nerve remains normal in course and caliber.     Tendinitis to the common flexor tendon origin without organized tear.     A low to intermediate grade partial-thickness tear is present to the origin of the radial collateral ligament and does not appear acute.  Please correlate this with point tenderness        Electronically signed by:David Christine  Date:                                            02/08/2024  Time:                                           16:34    MRI shows edema to the medial epicondyle as well as the subcutaneous tissue posteriorly., and flexor tendon origin inflammation.  No tear.  Degenerative changes of the LCL          EMG shows mild right ulnar neuropathy at elbow and mild carpal tunnel    1. Cubital tunnel syndrome on right            Plan:       Orders Placed This Encounter    X-Ray Chest PA And Lateral    CBC auto differential    Comprehensive metabolic panel    EKG 12-lead    Case Request Operating Room: RELEASE, CUBITAL TUNNEL        Imaging and exam findings discussed.  She has a medial elbow pain as well as sensory changes within the ulnar nerve distribution.  EMG shows right ulnar neuropathy at the elbow.  She has no carpal tunnel complaints or positive testing on exam.  Due to the longevity of the symptoms and her sensory changes on exam I have recommended cubital tunnel release.  We discussed operative and nonoperative options. The patient had an opportunity to ask questions. All questions were answered. The risks and benefits of surgery were discussed with the patient, including but not limited to bleeding, infection, damage to surrounding nerves and structures, need for further surgery, repair failure, anesthesia risk, progression of arthritis, blood clots, and medical risks of surgery. The patient voiced understanding of the risks and benefits and provided written consent to the procedure. Plan for right cubital tunnel release on 4/24/2025.      Colin Presley MD personally performed the services described in this documentation, including but not limited to patient's history, physical examination, and assessment and plan of care. All medical record entries made by Geetha Kauffman PA-C were performed at his direction and in his presence. The medical record was reviewed and is accurate and complete.

## 2025-04-14 ENCOUNTER — ANESTHESIA EVENT (OUTPATIENT)
Dept: SURGERY | Facility: HOSPITAL | Age: 65
End: 2025-04-14
Payer: MEDICARE

## 2025-04-24 ENCOUNTER — HOSPITAL ENCOUNTER (OUTPATIENT)
Facility: HOSPITAL | Age: 65
Discharge: HOME | End: 2025-04-24
Attending: REHABILITATION UNIT | Admitting: REHABILITATION UNIT
Payer: MEDICARE

## 2025-04-24 ENCOUNTER — ANESTHESIA (OUTPATIENT)
Dept: SURGERY | Facility: HOSPITAL | Age: 65
End: 2025-04-24
Payer: MEDICARE

## 2025-04-24 DIAGNOSIS — G89.18 POST-OP PAIN: Primary | ICD-10-CM

## 2025-04-24 DIAGNOSIS — G56.21 CUBITAL TUNNEL SYNDROME ON RIGHT: ICD-10-CM

## 2025-04-24 PROCEDURE — 25000003 PHARM REV CODE 250: Performed by: NURSE ANESTHETIST, CERTIFIED REGISTERED

## 2025-04-24 PROCEDURE — 63600175 PHARM REV CODE 636 W HCPCS: Performed by: ANESTHESIOLOGY

## 2025-04-24 PROCEDURE — 64718 REVISE ULNAR NERVE AT ELBOW: CPT | Mod: RT,,, | Performed by: REHABILITATION UNIT

## 2025-04-24 PROCEDURE — 71000016 HC POSTOP RECOV ADDL HR: Performed by: REHABILITATION UNIT

## 2025-04-24 PROCEDURE — 37000009 HC ANESTHESIA EA ADD 15 MINS: Performed by: REHABILITATION UNIT

## 2025-04-24 PROCEDURE — 63600175 PHARM REV CODE 636 W HCPCS

## 2025-04-24 PROCEDURE — 63600175 PHARM REV CODE 636 W HCPCS: Performed by: NURSE ANESTHETIST, CERTIFIED REGISTERED

## 2025-04-24 PROCEDURE — 25000003 PHARM REV CODE 250

## 2025-04-24 PROCEDURE — 64718 REVISE ULNAR NERVE AT ELBOW: CPT | Mod: AS,RT,,

## 2025-04-24 PROCEDURE — 25000003 PHARM REV CODE 250: Performed by: ANESTHESIOLOGY

## 2025-04-24 PROCEDURE — 36000706: Performed by: REHABILITATION UNIT

## 2025-04-24 PROCEDURE — 37000008 HC ANESTHESIA 1ST 15 MINUTES: Performed by: REHABILITATION UNIT

## 2025-04-24 PROCEDURE — 71000015 HC POSTOP RECOV 1ST HR: Performed by: REHABILITATION UNIT

## 2025-04-24 PROCEDURE — 36000707: Performed by: REHABILITATION UNIT

## 2025-04-24 PROCEDURE — 64415 NJX AA&/STRD BRCH PLXS IMG: CPT | Performed by: ANESTHESIOLOGY

## 2025-04-24 RX ORDER — CEFAZOLIN 2 G/1
2 INJECTION, POWDER, FOR SOLUTION INTRAMUSCULAR; INTRAVENOUS
Status: DISCONTINUED | OUTPATIENT
Start: 2025-04-24 | End: 2025-04-24 | Stop reason: HOSPADM

## 2025-04-24 RX ORDER — METOCLOPRAMIDE HYDROCHLORIDE 5 MG/ML
10 INJECTION INTRAMUSCULAR; INTRAVENOUS EVERY 6 HOURS PRN
Status: DISCONTINUED | OUTPATIENT
Start: 2025-04-24 | End: 2025-04-24 | Stop reason: HOSPADM

## 2025-04-24 RX ORDER — SODIUM CHLORIDE 9 MG/ML
INJECTION, SOLUTION INTRAVENOUS CONTINUOUS
Status: DISCONTINUED | OUTPATIENT
Start: 2025-04-24 | End: 2025-04-24 | Stop reason: HOSPADM

## 2025-04-24 RX ORDER — CALCIUM CARBONATE 200(500)MG
500 TABLET,CHEWABLE ORAL 3 TIMES DAILY PRN
Status: DISCONTINUED | OUTPATIENT
Start: 2025-04-24 | End: 2025-04-24 | Stop reason: HOSPADM

## 2025-04-24 RX ORDER — BUPIVACAINE HYDROCHLORIDE 5 MG/ML
INJECTION, SOLUTION EPIDURAL; INTRACAUDAL; PERINEURAL
Status: COMPLETED | OUTPATIENT
Start: 2025-04-24 | End: 2025-04-24

## 2025-04-24 RX ORDER — SODIUM CHLORIDE, SODIUM GLUCONATE, SODIUM ACETATE, POTASSIUM CHLORIDE AND MAGNESIUM CHLORIDE 30; 37; 368; 526; 502 MG/100ML; MG/100ML; MG/100ML; MG/100ML; MG/100ML
INJECTION, SOLUTION INTRAVENOUS CONTINUOUS
OUTPATIENT
Start: 2025-04-24 | End: 2025-05-24

## 2025-04-24 RX ORDER — ONDANSETRON 4 MG/1
4 TABLET, ORALLY DISINTEGRATING ORAL ONCE
Status: COMPLETED | OUTPATIENT
Start: 2025-04-24 | End: 2025-04-24

## 2025-04-24 RX ORDER — DIPHENHYDRAMINE HYDROCHLORIDE 50 MG/ML
25 INJECTION, SOLUTION INTRAMUSCULAR; INTRAVENOUS ONCE
OUTPATIENT
Start: 2025-04-24 | End: 2025-04-24

## 2025-04-24 RX ORDER — ONDANSETRON HYDROCHLORIDE 2 MG/ML
4 INJECTION, SOLUTION INTRAVENOUS EVERY 6 HOURS PRN
Status: DISCONTINUED | OUTPATIENT
Start: 2025-04-24 | End: 2025-04-24 | Stop reason: HOSPADM

## 2025-04-24 RX ORDER — ALUMINUM HYDROXIDE, MAGNESIUM HYDROXIDE, AND SIMETHICONE 1200; 120; 1200 MG/30ML; MG/30ML; MG/30ML
30 SUSPENSION ORAL EVERY 6 HOURS PRN
Status: DISCONTINUED | OUTPATIENT
Start: 2025-04-24 | End: 2025-04-24 | Stop reason: HOSPADM

## 2025-04-24 RX ORDER — PROPOFOL 10 MG/ML
VIAL (ML) INTRAVENOUS CONTINUOUS PRN
Status: DISCONTINUED | OUTPATIENT
Start: 2025-04-24 | End: 2025-04-24

## 2025-04-24 RX ORDER — ONDANSETRON HYDROCHLORIDE 2 MG/ML
4 INJECTION, SOLUTION INTRAVENOUS DAILY PRN
OUTPATIENT
Start: 2025-04-24

## 2025-04-24 RX ORDER — GLUCAGON 1 MG
1 KIT INJECTION
OUTPATIENT
Start: 2025-04-24

## 2025-04-24 RX ORDER — SODIUM CHLORIDE, SODIUM LACTATE, POTASSIUM CHLORIDE, CALCIUM CHLORIDE 600; 310; 30; 20 MG/100ML; MG/100ML; MG/100ML; MG/100ML
INJECTION, SOLUTION INTRAVENOUS CONTINUOUS
OUTPATIENT
Start: 2025-04-24

## 2025-04-24 RX ORDER — LIDOCAINE HYDROCHLORIDE 10 MG/ML
1 INJECTION, SOLUTION EPIDURAL; INFILTRATION; INTRACAUDAL; PERINEURAL ONCE
Status: DISCONTINUED | OUTPATIENT
Start: 2025-04-24 | End: 2025-04-24 | Stop reason: HOSPADM

## 2025-04-24 RX ORDER — METHOCARBAMOL 500 MG/1
500 TABLET, FILM COATED ORAL EVERY 6 HOURS PRN
Status: DISCONTINUED | OUTPATIENT
Start: 2025-04-24 | End: 2025-04-24 | Stop reason: HOSPADM

## 2025-04-24 RX ORDER — ROPIVACAINE HYDROCHLORIDE 5 MG/ML
INJECTION, SOLUTION EPIDURAL; INFILTRATION; PERINEURAL
Status: DISCONTINUED
Start: 2025-04-24 | End: 2025-04-24 | Stop reason: HOSPADM

## 2025-04-24 RX ORDER — MORPHINE SULFATE 4 MG/ML
4 INJECTION, SOLUTION INTRAMUSCULAR; INTRAVENOUS
Refills: 0 | Status: DISCONTINUED | OUTPATIENT
Start: 2025-04-24 | End: 2025-04-24 | Stop reason: HOSPADM

## 2025-04-24 RX ORDER — MUPIROCIN 20 MG/G
OINTMENT TOPICAL 2 TIMES DAILY
Status: DISCONTINUED | OUTPATIENT
Start: 2025-04-24 | End: 2025-04-24 | Stop reason: HOSPADM

## 2025-04-24 RX ORDER — HYDROCODONE BITARTRATE AND ACETAMINOPHEN 5; 325 MG/1; MG/1
1 TABLET ORAL EVERY 4 HOURS PRN
Status: DISCONTINUED | OUTPATIENT
Start: 2025-04-24 | End: 2025-04-24

## 2025-04-24 RX ORDER — MIDAZOLAM HYDROCHLORIDE 2 MG/2ML
2 INJECTION, SOLUTION INTRAMUSCULAR; INTRAVENOUS ONCE AS NEEDED
Status: COMPLETED | OUTPATIENT
Start: 2025-04-24 | End: 2025-04-24

## 2025-04-24 RX ORDER — HYDROCODONE BITARTRATE AND ACETAMINOPHEN 5; 325 MG/1; MG/1
1 TABLET ORAL EVERY 6 HOURS PRN
Qty: 28 TABLET | Refills: 0 | Status: SHIPPED | OUTPATIENT
Start: 2025-04-24

## 2025-04-24 RX ORDER — ACETAMINOPHEN 325 MG/1
650 TABLET ORAL EVERY 4 HOURS PRN
Status: DISCONTINUED | OUTPATIENT
Start: 2025-04-24 | End: 2025-04-24 | Stop reason: HOSPADM

## 2025-04-24 RX ORDER — HYDROCODONE BITARTRATE AND ACETAMINOPHEN 5; 325 MG/1; MG/1
1 TABLET ORAL EVERY 4 HOURS PRN
Refills: 0 | Status: DISCONTINUED | OUTPATIENT
Start: 2025-04-24 | End: 2025-04-24 | Stop reason: HOSPADM

## 2025-04-24 RX ORDER — PHENYLEPHRINE HCL IN 0.9% NACL 1 MG/10 ML
SYRINGE (ML) INTRAVENOUS
Status: DISCONTINUED | OUTPATIENT
Start: 2025-04-24 | End: 2025-04-24

## 2025-04-24 RX ADMIN — Medication 100 MCG: at 08:04

## 2025-04-24 RX ADMIN — BUPIVACAINE HYDROCHLORIDE 30 ML: 5 INJECTION, SOLUTION EPIDURAL; INTRACAUDAL at 07:04

## 2025-04-24 RX ADMIN — PROPOFOL 300 MCG/KG/MIN: 10 INJECTION, EMULSION INTRAVENOUS at 07:04

## 2025-04-24 RX ADMIN — CEFAZOLIN 2 G: 2 INJECTION, POWDER, FOR SOLUTION INTRAMUSCULAR; INTRAVENOUS at 07:04

## 2025-04-24 RX ADMIN — MIDAZOLAM HYDROCHLORIDE 2 MG: 1 INJECTION, SOLUTION INTRAMUSCULAR; INTRAVENOUS at 07:04

## 2025-04-24 RX ADMIN — ONDANSETRON 4 MG: 4 TABLET, ORALLY DISINTEGRATING ORAL at 06:04

## 2025-04-24 RX ADMIN — SODIUM CHLORIDE: 9 INJECTION, SOLUTION INTRAVENOUS at 06:04

## 2025-04-24 NOTE — DISCHARGE INSTRUCTIONS
[facility] DISCHARGE [place] [address]  [phone]    DIET    YOUR FIRST MEAL SHOULD BE LIQUID: I.E. SOUPS, JELLO, JUICE. IF YOUR LIQUID MEAL IS TOLERATED WELL THEN YOU MAY PROGRESS TO A SMALL LIGHT MEAL.   IF NAUSEA AND VOMITING OCCUR RETURN TO THE LIQUID DIET AND PROGRESS TO A NORMAL SOLID DIET SLOWLY.  IF THE NAUSEA AND VOMITING DOES NOT STOP, NOTIFY YOUR HEALTH CARE PROVIDER.      GENERAL ANESTHESIA OR SEDATION    DO NOT DRIVE OR PARTICIPATE IN ANY ACTIVITIES THAT REQUIRE COORDINATION FOR THE NEXT 24 HOURS: I.E. SWIMMING, BIKING, OPERATING HEAVY MACHINERY, COOKING, USING POWER TOOLS, CLIMBING LADDERS.   FOR THE NEXT 24 HOURS DO NOT: DRIVE, DRINK ALCOHOL, MAKE ANY IMPORTANT DECISIONS OR SIGN ANY LEGAL DOCUMENTS.   STAY WITH AN ADULT DURING THE 24 HOURS AFTER YOUR SURGERY.   DRINK ENOUGH FLUIDS TO KEEP YOUR URINE CLEAR TO PALE YELLOW.      PREVENTING CONSTIPATION AFTER SURGERY    EAT FOOD HIGH IN FIBER AND DRINK PLENTY OF FLUIDS, ESPECIALLY WATER.   YOU MAY TAKE AN OVER THE COUNTER FIBER SUPPLEMENT OR STOOL SOFTENER AS DIRECTED ON THE PACKAGE.   STAYING MOBILE, IF POSSIBLE, HELPS TO PREVENT CONSTIPATION.  CONTACT YOUR DOCTOR IF YOU HAVE NOT HAD A BOWEL MOVEMENT IN 3 DAYS AFTER SURGERY.       INFECTION CONTROL    KEEP YOUR DRESSING CLEAN, DRY AND INTACT.   FOLLOW PHYSICIAN INSTRUCTIONS REGARDING REMOVAL OF DRESSING.  DO NOT TOUCH SURGICAL SITE OR APPLY LOTIONS, POWDERS, CREAMS OR OINTMENTS ON YOUR INCISION UNLESS INSTRUCTED TO DO SO BY YOUR HEALTH CARE PROVIDER.  ONCE THE DRESSING HAS BEEN REMOVED, CHECK THE INCISION SITE DAILY FOR: INCREASED REDNESS, SWELLING, FLUID OR BLOOD, WARMTH, PUS, FOUL SMELL OR INCREASED PAIN.      DEEP VEIN THROMBOSIS (DVT)    A DVT IS A BLOOD CLOT THAT CAN DEVELOP IN THE DEEP AND LARGER VEINS OF THE LEG, ARM OR PELVIS.   RISK FACTORS INCLUDE SITTING OR LYING FOR LONG PERIODS OF TIME. THIS INCLUDES RECOVERING FROM SURGERY.  PREVENTION INCLUDES:  AVOID SITTING STILL FOR LONG PERIODS WITHOUT MOVING YOUR LEGS, DO NOT SMOKE AND IF POSSIBLE AVOID MEDICATIONS THAT CONTAIN ESTROGEN.   SIGNS AND SYMPTOMS THAT SHOULD BE REPORTED IMMEDIATELY INCLUDE: SWELLING IN AN ARM OR LEG, WARMTH, REDNESS OR PAIN IN ONE AREA OF THE LEG OR ARM THAT DOES NOT COME FROM THE INCISION. IF THE CLOT IS IN YOUR LEG, THE SYMPTOMS WILL BE WORSE WHEN STANDING OR WALKING.   SIGNS OF A PULMONARY EMBOLISM OR PE (A CLOT THAT MOVED TO YOUR LUNG): SHORTNESS OF BREATH, COUGHING , ESPECIALLY IF ACCOMPANIED WITH BLOODY MUCUS, CHEST PAIN OR RAPID HEART RATE.  IF YOU EXPERIENCE THESE SYMPTOMS, YOU SHOULD GET EMERGENCY TREATMENT RIGHT AWAY. DO NOT WAIT TO SEE IF THESE SYMPTOMS WILL GO AWAY. DO NOT DRIVE YOURSELF TO THE HOSPITAL.       CONTACT YOUR HEALTH CARE PROVIDER IF:    YOU HAVE REDNESS, SWELLING OR PAIN AROUND YOUR INCISION.  YOUR INCISION FEELS WARM TO THE TOUCH OR IS LEAKING EXCESSIVE FLUID/ BLOOD.  YOUR SUTURES OR STAPLES HAVE COME UNDONE.  YOU HAVE A TEMPERATURE .5 OR GREATER.  YOU ARE NOT ABLE TO URINATE WITHIN 6 HOURS AFTER SURGERY.  YOU HAVE UNRESOLVED NAUSEA AND VOMITING.       SEEK IMMEDIATE MEDICAL CARE IF:    YOU HAVE PERSISTENT NAUSEA AND VOMITING.   YOU ARE UNABLE TO EAT OR DRINK.   YOU HAVE DIFFICULTY SPEAKING AND/ OR BREATHING.  YOUR SKIN COLOR APPEARS BLUE OR GRAY.  YOU HAVE A RED STREAK COMING FROM YOUR INCISION.  YOUR INCISION BLEEDS THROUGH THE DRESSING AND DOES NOT STOP WITH GENTLY PRESSURE.  YOU HAVE SEVERE PAIN THAT DOES NOT DECREASE WITH YOUR MEDICATIONS.

## 2025-04-24 NOTE — TRANSFER OF CARE
"Anesthesia Transfer of Care Note    Patient: Harriet Espinoza    Procedure(s) Performed: Procedure(s) (LRB):  RELEASE, CUBITAL TUNNEL (Right)    Patient location: OPS    Anesthesia Type: general    Transport from OR: Transported from OR on room air with adequate spontaneous ventilation    Post pain: adequate analgesia    Post assessment: no apparent anesthetic complications and tolerated procedure well    Post vital signs: stable    Level of consciousness: alert, oriented and awake    Nausea/Vomiting: no nausea/vomiting    Complications: none    Transfer of care protocol was followed    Last vitals: Visit Vitals  /83   Pulse 79   Temp 35.6 °C (96 °F) (Tympanic)   Resp 16   Ht 5' 1" (1.549 m)   Wt 80.1 kg (176 lb 9.4 oz)   LMP  (LMP Unknown)   SpO2 96%   Breastfeeding No   BMI 33.37 kg/m²     " Noted

## 2025-04-24 NOTE — ANESTHESIA PROCEDURE NOTES
Peripheral Block    Patient location during procedure: pre-op   Block not for primary anesthetic.  Reason for block: at surgeon's request and post-op pain management   Post-op Pain Location: RT ARM   Start time: 4/24/2025 7:06 AM  Timeout: 4/24/2025 7:05 AM   End time: 4/24/2025 7:08 AM    Staffing  Authorizing Provider: Sergo Garza MD  Performing Provider: Sergo Garza MD    Staffing  Performed by: Sergo Garza MD  Authorized by: Sergo Garza MD    Preanesthetic Checklist  Completed: patient identified, IV checked, site marked, risks and benefits discussed, surgical consent, monitors and equipment checked, pre-op evaluation and timeout performed  Peripheral Block  Patient position: supine  Prep: ChloraPrep  Patient monitoring: heart rate, cardiac monitor, continuous pulse ox, continuous capnometry and frequent blood pressure checks  Block type: supraclavicular  Laterality: right  Injection technique: single shot  Needle  Needle type: Stimuplex   Needle gauge: 22 G  Needle length: 4 in  Needle localization: anatomical landmarks, ultrasound guidance, nerve stimulator and paresthesias   -ultrasound image captured on disc.  Assessment  Injection assessment: negative aspiration, negative parasthesia and local visualized surrounding nerve  Paresthesia pain: none  Heart rate change: no  Slow fractionated injection: yes  Pain Tolerance: comfortable throughout block and no complaints  Medications:    Medications: bupivacaine (pf) (MARCAINE) injection 0.5% - Perineural   30 mL - 4/24/2025 7:07:00 AM    Additional Notes  VSS.  [facility] RN monitoring vitals throughout procedure.  Patient tolerated procedure well.   Sedation titrated. See nurses flowsheet.

## 2025-04-24 NOTE — ANESTHESIA PREPROCEDURE EVALUATION
04/24/2025  Harriet Espinoza is a 65 y.o., female.  Procedure Information    Case: [id number] Date/Time: 04/24/25 0730   Procedure: RELEASE, CUBITAL TUNNEL (Right)   Anesthesia type: General/Regional   Diagnosis: Cubital tunnel syndrome on right [G56.21]   Pre-op diagnosis: Cubital tunnel syndrome on right [G56.21]   Location: [facility] OR  / [facility] OR   Surgeons: Colin Presley MD       Pre-op Assessment    I have reviewed the Patient Summary Reports.     I have reviewed the Nursing Notes. I have reviewed the NPO Status.   I have reviewed the Medications.     Review of Systems  Anesthesia Hx:  No problems with previous Anesthesia                Hematology/Oncology:  Hematology Normal   Oncology Normal                                   EENT/Dental:  EENT/Dental Normal           Cardiovascular:  Exercise tolerance: good   Hypertension                  Functional Capacity good / => 4 METS                         Pulmonary:  Pulmonary Normal                       Renal/:   Denies Chronic Renal Disease.                Hepatic/GI:  Hepatic/GI Normal                    Musculoskeletal:  Musculoskeletal Normal                Neurological:  Neurology Normal                                      Endocrine:   Hypothyroidism        Denies Morbid Obesity / BMI > 40  Dermatological:  Skin Normal    Psych:  Psychiatric Normal                    Physical Exam  General: Alert, Oriented, Well nourished and Cooperative    Airway:  Mallampati: II   Mouth Opening: Normal  TM Distance: Normal  Tongue: Normal  Neck ROM: Normal ROM    Dental:  Intact    Chest/Lungs:  Clear to auscultation, Normal Respiratory Rate    Heart:  Rate: Normal  Rhythm: Regular Rhythm       Latest Reference Range & Units 04/08/25 09:40   WBC 4.50 - 11.50 x10(3)/mcL 6.11   RBC 4.20 - 5.40 x10(6)/mcL 4.20   Hemoglobin 12.0 - 16.0 g/dL 14.1   Hematocrit 37.0 - 47.0 %  Patient called in- son positive for COVID and she's not feeling well   She wants to be tested but cannot come at 1pm today  When would you like her to come? 41.2   MCV 80.0 - 94.0 fL 98.1 (H)   MCH 27.0 - 31.0 pg 33.6 (H)   MCHC 33.0 - 36.0 g/dL 34.2   RDW 11.5 - 17.0 % 12.8   Platelet Count 130 - 400 x10(3)/mcL 200   MPV 7.4 - 10.4 fL 10.0   Neut % % 48.3   LYMPH % % 40.4   Mono % % 9.0   Eos % % 1.5   Basophil % % 0.5   Immature Granulocytes % 0.3   Neut # 2.1 - 9.2 x10(3)/mcL 2.95   Lymph # 0.6 - 4.6 x10(3)/mcL 2.47   Mono # 0.1 - 1.3 x10(3)/mcL 0.55   Eos # 0 - 0.9 x10(3)/mcL 0.09   Baso # <=0.2 x10(3)/mcL 0.03   Immature Grans (Abs) 0.00 - 0.04 x10(3)/mcL 0.02   nRBC % 0.0   Sodium 136 - 145 mmol/L 141   Potassium 3.5 - 5.1 mmol/L 4.0   Chloride 98 - 107 mmol/L 108 (H)   CO2 23 - 31 mmol/L 27   Anion Gap mEq/L 6.0   BUN 9.8 - 20.1 mg/dL 20.7 (H)   Creatinine 0.55 - 1.02 mg/dL 1.13 (H)   BUN/CREAT RATIO  18   eGFR mL/min/1.73/m2 54   Glucose 82 - 115 mg/dL 99   Calcium 8.4 - 10.2 mg/dL 9.6   ALP 40 - 150 unit/L 78   PROTEIN TOTAL 5.8 - 7.6 gm/dL 7.3   Albumin 3.4 - 4.8 g/dL 4.1   Albumin/Globulin Ratio 1.1 - 2.0 ratio 1.3   BILIRUBIN TOTAL <=1.5 mg/dL 0.5   AST 11 - 45 unit/L 19   ALT 0 - 55 unit/L 19   Globulin, Total 2.4 - 3.5 gm/dL 3.2   (H): Data is abnormally high  Test Reason : G56.21,    Vent. Rate :  72 BPM     Atrial Rate :  72 BPM     P-R Int : 154 ms          QRS Dur :  86 ms      QT Int : 402 ms       P-R-T Axes :  49  -2  24 degrees    QTcB Int : 440 ms    Normal sinus rhythm  Normal ECG  No previous ECGs available  Confirmed by Rigoberto Horowitz ([id number]) on 4/8/2025 12:47:44 PM    Referred By: MERT VAZQUEZ           Confirmed By: Rigoberto Horowitz     Specimen Collected: 04/08/25 08:45 CDT Last Resulted: 04/08/25 12:47 CDT           Anesthesia Plan  Type of Anesthesia, risks & benefits discussed:    Anesthesia Type: Regional, MAC  Intra-op Monitoring Plan: Standard ASA Monitors  Post Op Pain Control Plan: multimodal analgesia  Induction:  IV  Airway Plan: Direct  Informed Consent: Informed consent signed with the Patient and all parties understand the risks and  agree with anesthesia plan.  All questions answered. Patient consented to blood products? Yes  ASA Score: 2  Day of Surgery Review of History & Physical: H&P Update referred to the surgeon/provider.I have interviewed and examined the patient. I have reviewed the patient's H&P dated: There are no significant changes.     Ready For Surgery From Anesthesia Perspective.     .

## 2025-04-24 NOTE — OP NOTE
Operative report     Date of Operative Procedure: 2025           Location: [place] [address]     Name: Harriet Espinoza  : 1960   MRN: [id number]     Diagnoses:     Preoperative diagnosis:   Right Cubital tunnel syndrome    Postoperative diagnosis: same     Procedure:   Right Cubital tunnel release      Attending Surgeon:   Colin Presley MD    Assistant:  HANS Glover PA-C was essential for manipulation of the extremity, retraction, and closure.      Anesthesia: General with regional nerve block                         Antibiotics: Ancef 2g     Estimated Blood Loss: minimal; less than 5cc     Specimen: none    Complications: None    History of present illness: Patient is a pleasant 65 y.o.-year-old who has had persistent numbness, tingling, and pain into the hand.  Subjective and objective signs of cubital tunnel syndrome were observed. Patient did not respond to conservative treatment and, I recommended open cubital tunnel release.  The planned procedures were discussed with the patient including the associated risks. The risks included but are not limited to bleeding, infection, nerve damage, failure to heal, possible need for reoperation, possible recurrence, or any associated risk of the anesthesia. Patient voiced understanding and agreed to proceed as planned.    Procedure:   Patient was met in the preoperative holding area where verbal and written informed consent were reobtained and confirmed from the patient.  The operative extremity was marked with an indelible ink marker and the patient was taken to the preoperative holding area and administered a regional nerve block by the anesthesia staff.  Patient was then taken back to the operative suite and succumbed to general anesthesia.  The patient was positioned supine on the operating table with all bony prominences being well-padded.  A nonsterile tourniquet was applied to the operative extremity.  The operative extremity was then  prepped and draped in the normal sterile fashion.  A preoperative timeout was performed in which the patient, site, side, and operation to be performed were confirmed. All were in agreement and there were no concerns for moving forward. Preoperative IV antibiotics were administered and we commenced the procedure.     Planned curvilinear incision was marked over the medial elbow between the medial epicondyle and olecranon. The upper extremity was then exsanguinated by esmarch. The tourniquet was then inflated to 250 mmHg. Skin incision was made and hemostasis was achieved with Bovie electrocautery.  Dissection was carefully taken through subcutaneous tissue with care to avoid any injury to crossing cutaneous branches specifically the medial antebrachial cutaneous nerve.  Dissection was carried down to the ulnar nerve at the medial epicondyle.  The ligament of Calle was released and the nerve was freed at the level of the medial epicondyle.  Under direct visualization, the FCU fascia was carefully released.   The sheath above the ulnar nerve was carefully released to the level of the 1st motor branch under direct visualization. Attention was turned proximally. Under direct visualization the arcade of [place] was released.  Sheath above the ulnar nerve was also released to the mid brachium. The elbow was ranged and the nerve was found to not sublux.     The tourniquet was deflated and hemostasis was achieved.The wound was then copiously irrigated with normal saline.  Incision was closed in layers. A sterile dressing was applied. The patient was then awakened from anesthesia. The patient was transported to recovery room in stable condition. There were no intraoperative or immediate postoperative complications. All counts were reported as correct.     Postoperative plan:   The patient will maintain surgical dressing clean, dry, and intact for 72 hours. Elevate operative extremity. Start range of motion exercises. The  patient will be given standard medications for pain control. Follow up in 10-14 days for wound check.

## 2025-04-24 NOTE — ANESTHESIA POSTPROCEDURE EVALUATION
Anesthesia Post Evaluation    Patient: Harriet Espinoza    Procedure(s) Performed: Procedure(s) (LRB):  RELEASE, CUBITAL TUNNEL (Right)    Final Anesthesia Type: general      Patient location during evaluation: PACU  Patient participation: Yes- Able to Participate  Level of consciousness: awake and alert and oriented  Post-procedure vital signs: reviewed and stable  Pain management: adequate  Airway patency: patent  MARLIN mitigation strategies: Verification of full reversal of neuromuscular block  PONV status at discharge: No PONV  Anesthetic complications: no      Cardiovascular status: blood pressure returned to baseline and stable  Respiratory status: spontaneous ventilation and unassisted  Hydration status: euvolemic  Follow-up not needed.  Comments: [facility]              Vitals Value Taken Time   /90 04/24/25 09:01   Temp  04/24/25 10:12   Pulse 69 04/24/25 09:01   Resp 20 04/24/25 09:00   SpO2 98 % 04/24/25 09:01   Vitals shown include unfiled device data.      No case tracking events are documented in the log.      Pain/Marlena Score: Marlena Score: 10 (4/24/2025  9:10 AM)  Modified Marlena Score: 20 (4/24/2025  9:10 AM)

## 2025-04-24 NOTE — DISCHARGE SUMMARY
[facility] Orthopaedics - Periop Services  Discharge Note  Short Stay    Procedure(s) (LRB):  RELEASE, CUBITAL TUNNEL (Right)      OUTCOME: Patient tolerated treatment/procedure well without complication and is now ready for discharge.    DISPOSITION: Home or Self Care    FINAL DIAGNOSIS:  Right cubital tunnel syndrome    FOLLOWUP: In clinic    DISCHARGE INSTRUCTIONS:    Discharge Procedure Orders   Diet general     Activity as tolerated     Keep surgical extremity elevated     Ice to affected area     Lifting restrictions     No driving, operating heavy equipment or signing legal documents while taking pain medication.     Other restrictions (specify):   Order Comments: Okay to wean sling as tolerated.     Remove dressing in 72 hours     Wound care routine (specify)   Order Comments: Wound care routine: keep dressing clean, dry, and intact. Ok to remove in 3 days. Ok to shower once removed. No submersion.     Call MD for:  temperature >100.4     Call MD for:  persistent nausea and vomiting     Call MD for:  severe uncontrolled pain     Call MD for:  difficulty breathing, headache or visual disturbances     Call MD for:  redness, tenderness, or signs of infection (pain, swelling, redness, odor or green/yellow discharge around incision site)     Call MD for:  hives     Call MD for:  persistent dizziness or light-headedness     Call MD for:  extreme fatigue     Shower on day dressing removed (No bath)        TIME SPENT ON DISCHARGE: 5 minutes

## 2025-04-25 VITALS
DIASTOLIC BLOOD PRESSURE: 90 MMHG | TEMPERATURE: 96 F | RESPIRATION RATE: 20 BRPM | WEIGHT: 176.56 LBS | SYSTOLIC BLOOD PRESSURE: 142 MMHG | HEART RATE: 62 BPM | BODY MASS INDEX: 33.34 KG/M2 | OXYGEN SATURATION: 100 % | HEIGHT: 61 IN

## 2025-05-07 ENCOUNTER — OFFICE VISIT (OUTPATIENT)
Dept: ORTHOPEDICS | Facility: CLINIC | Age: 65
End: 2025-05-07
Payer: MEDICARE

## 2025-05-07 VITALS
BODY MASS INDEX: 33.34 KG/M2 | DIASTOLIC BLOOD PRESSURE: 86 MMHG | SYSTOLIC BLOOD PRESSURE: 138 MMHG | HEART RATE: 76 BPM | WEIGHT: 176.56 LBS | HEIGHT: 61 IN

## 2025-05-07 DIAGNOSIS — Z98.890 S/P CUBITAL TUNNEL RELEASE: Primary | ICD-10-CM

## 2025-05-07 PROCEDURE — 99024 POSTOP FOLLOW-UP VISIT: CPT | Mod: POP,,,

## 2025-05-07 NOTE — PROGRESS NOTES
Orthopedic Clinic     Chief Complaint: Post Op Visit    Consulting Physician: No ref. provider found    right Cubital Tunnel Release on 4/24/25    History of Present Illness: Harriet Espinoza is a 65 y.o. female here today for post op cubital tunnel release visit. Patient is doing well. No fevers or chills. Numbness in hand is improving. Night time symptoms improving.       Past Medical History:   Diagnosis Date    Arthritis     Digestive disorder     Gout, unspecified     Hypertension         Past Surgical History:   Procedure Laterality Date    ABDOMINOPLASTY      BACK SURGERY      BUNIONECTOMY Left     COLONOSCOPY      CYST REMOVAL Right 02/14/2024    Procedure: EXCISION, CYST;  Surgeon: Annette Faith MD;  Location: [facility] OR;  Service: General;  Laterality: Right;  Excision right face cyst.  Laterality: Right    HYSTERECTOMY      partial    THYROIDECTOMY, PARTIAL Left     TUBAL LIGATION      ULNAR TUNNEL RELEASE Right 4/24/2025    Procedure: RELEASE, CUBITAL TUNNEL;  Surgeon: Colin Presley MD;  Location: [facility] OR;  Service: Orthopedics;  Laterality: Right;        Social History     Tobacco Use    Smoking status: Never    Smokeless tobacco: Never   Substance and Sexual Activity    Alcohol use: Yes     Comment: socially    Drug use: Never    Sexual activity: Yes        Current Outpatient Medications   Medication Instructions    amLODIPine (NORVASC) 2.5 mg, Daily before evening meal    candesartan-hydrochlorothiazid 32-25 mg Tab 1 tablet, Daily    carvediloL (COREG) 6.25 mg, 2 times daily with meals    colchicine (COLCRYS) 0.6 mg, Daily    HYDROcodone-acetaminophen (NORCO) 5-325 mg per tablet 1 tablet, Oral, Every 6 hours PRN    omeprazole (PRILOSEC) 40 mg, 2 times daily before meals    rosuvastatin (CRESTOR) 20 mg, Daily    VIBERZI 100 mg, Daily       Review of patient's allergies indicates:   Allergen Reactions    Solu-medrol [methylprednisolone sodium succ] Rash        Patient Care Team:  Cayden Menjivar,  "MD as PCP - General (Family Medicine)     Review of Systems:    Constitution:   Denies chills, fever, and sweats.  HENT:   Denies headaches or blurry vision.  Cardiovascular:   Denies chest pain or irregular heart beat.  Respiratory:   Denies cough or shortness of breath.  Gastrointestinal:  Denies abdominal pain, nausea, or vomiting.  Musculoskeletal:   Denies muscle cramps.  Neurological:   Denies dizziness or focal weakness.  Psychiatric/Behavior: Normal mental status.  Hematology/Lymph:  Denies bleeding problem or easy bruising/bleeding.  Skin:    Denies rash or suspicious lesions.    Physical Examination:    Vital Signs:    Vitals:    05/07/25 0920   BP: (!) 141/92   Pulse: 76   Weight: 80.1 kg (176 lb 9.4 oz)   Height: 5' 1" (1.549 m)   Body mass index is 33.37 kg/m².    Constitution:   Well-developed, well nourished patient in no acute distress.  Neurological:   Alert and oriented x 3 and cooperative to examination.     Psychiatric/Behavior: Normal mental status.  Respiratory:   No shortness of breath. Non-labored breathing.  Eyes:    Extraoccular muscles intact.    Musculoskeletal Examination:   right Upper Extremity:  Surgical incision is healing well with sutures in place no evidence of infection.  Sensation to light touch in ulnar nerve distribution.  Radial pulses 2+ hand is warm well perfused.    Imaging:   No new imaging.     Assessment:  Post right cubital tunnel release    Plan:  Patient is doing well.  Sutures out today.   Patient was counseled on scar massage.  I educated the patient that full nerve recovery may take anywhere from 12-18 months. Follow up in 6 weeks to ensure patient is doing well.    Follow Up: 6 weeks    X-Ray at Next Visit: None    "

## 2025-06-18 ENCOUNTER — OFFICE VISIT (OUTPATIENT)
Dept: ORTHOPEDICS | Facility: CLINIC | Age: 65
End: 2025-06-18
Payer: MEDICARE

## 2025-06-18 VITALS
DIASTOLIC BLOOD PRESSURE: 94 MMHG | SYSTOLIC BLOOD PRESSURE: 139 MMHG | BODY MASS INDEX: 33.34 KG/M2 | HEART RATE: 72 BPM | WEIGHT: 176.56 LBS | HEIGHT: 61 IN

## 2025-06-18 DIAGNOSIS — G56.21 CUBITAL TUNNEL SYNDROME ON RIGHT: ICD-10-CM

## 2025-06-18 DIAGNOSIS — Z98.890 S/P CUBITAL TUNNEL RELEASE: Primary | ICD-10-CM

## 2025-06-18 NOTE — LETTER
June 18, 2025       Orthopaedic Clinic  [address], [address]  [place] [address]  Phone: [phone]  Fax: [phone]       Patient: Harriet Espinoza   YOB: 1960  Date of Visit: 06/18/2025    To Whom It May Concern:    Elvie Espinoza  was at [facility] on 06/18/2025. Our office and patient ARE  concerned about patient's elevated blood pressure. Their blood pressure has been consistently elevated since these last several months. Patient would like to address these with her primary care as soon as she is able. If you have any questions or concerns, or if I can be of further assistance, please do not hesitate to contact me.    Sincerely,    DARIUS Farfan PA-C Paul Williams, MD

## 2025-06-18 NOTE — PROGRESS NOTES
Orthopedic Clinic     Chief Complaint: Post Op Visit    Consulting Physician: No ref. provider found    right Cubital Tunnel Release on 4/24/25    History of Present Illness: Harriet Espinoza is a 65 y.o. female here today for post op cubital tunnel release visit. Patient is doing well. No fevers or chills.  Sensation to hand is normal.  She still has some medial elbow discomfort and some mild swelling.  This is worse in the morning.    Past Medical History:   Diagnosis Date    Arthritis     Digestive disorder     Gout, unspecified     Hypertension         Past Surgical History:   Procedure Laterality Date    ABDOMINOPLASTY      BACK SURGERY      BUNIONECTOMY Left     COLONOSCOPY      CYST REMOVAL Right 02/14/2024    Procedure: EXCISION, CYST;  Surgeon: Annette Faith MD;  Location: [facility] OR;  Service: General;  Laterality: Right;  Excision right face cyst.  Laterality: Right    HYSTERECTOMY      partial    THYROIDECTOMY, PARTIAL Left     TUBAL LIGATION      ULNAR TUNNEL RELEASE Right 4/24/2025    Procedure: RELEASE, CUBITAL TUNNEL;  Surgeon: Colin Presley MD;  Location: [facility] OR;  Service: Orthopedics;  Laterality: Right;        Social History     Tobacco Use    Smoking status: Never    Smokeless tobacco: Never   Substance and Sexual Activity    Alcohol use: Yes     Comment: socially    Drug use: Never    Sexual activity: Yes        Current Outpatient Medications   Medication Instructions    amLODIPine (NORVASC) 2.5 mg, Daily before evening meal    candesartan-hydrochlorothiazid 32-25 mg Tab 1 tablet, Daily    carvediloL (COREG) 6.25 mg, 2 times daily with meals    colchicine (COLCRYS) 0.6 mg, Daily    HYDROcodone-acetaminophen (NORCO) 5-325 mg per tablet 1 tablet, Oral, Every 6 hours PRN    omeprazole (PRILOSEC) 40 mg, 2 times daily before meals    rosuvastatin (CRESTOR) 20 mg, Daily    VIBERZI 100 mg, Daily       Review of patient's allergies indicates:   Allergen Reactions    Solu-medrol [methylprednisolone  "sodium succ] Rash        Patient Care Team:  Cayden Menjivar MD as PCP - General (Family Medicine)     Review of Systems:    Constitution:   Denies chills, fever, and sweats.  HENT:   Denies headaches or blurry vision.  Cardiovascular:   Denies chest pain or irregular heart beat.  Respiratory:   Denies cough or shortness of breath.  Gastrointestinal:  Denies abdominal pain, nausea, or vomiting.  Musculoskeletal:   Denies muscle cramps.  Neurological:   Denies dizziness or focal weakness.  Psychiatric/Behavior: Normal mental status.  Hematology/Lymph:  Denies bleeding problem or easy bruising/bleeding.  Skin:    Denies rash or suspicious lesions.    Physical Examination:    Vital Signs:    Vitals:    06/18/25 0921   BP: (!) 142/90   Pulse: 75   Weight: 80.1 kg (176 lb 9.4 oz)   Height: 5' 1" (1.549 m)   Body mass index is 33.37 kg/m².    Constitution:   Well-developed, well nourished patient in no acute distress.  Neurological:   Alert and oriented x 3 and cooperative to examination.     Psychiatric/Behavior: Normal mental status.  Respiratory:   No shortness of breath. Non-labored breathing.  Eyes:    Extraoccular muscles intact.    Musculoskeletal Examination:   right Upper Extremity:  Surgical incision is well healed with no evidence of infection.  Sensation to light touch in ulnar nerve distribution.  Radial pulses 2+ hand is warm well perfused.  Full motion of the elbow and distally.  She does have some tenderness to the medial elbow with mild associated swelling.    Imaging:   No new imaging.     Assessment:  Post right cubital tunnel release    Plan:  Patient is doing well.  Referred to Occupational therapy at [facility] in [place].  Scar massage and desensitization as well as nerve treatment.  I educated the patient that full nerve recovery may take anywhere from 12-18 months. Follow up in 6-8 weeks to ensure patient is doing well.    Follow Up: 6-8 weeks    X-Ray at Next Visit: None      "

## 2025-07-21 ENCOUNTER — LAB VISIT (OUTPATIENT)
Dept: LAB | Facility: HOSPITAL | Age: 65
End: 2025-07-21
Attending: FAMILY MEDICINE
Payer: MEDICARE

## 2025-07-21 DIAGNOSIS — E78.2 MIXED HYPERLIPIDEMIA: Primary | ICD-10-CM

## 2025-07-21 LAB
ALBUMIN SERPL-MCNC: 3.9 G/DL (ref 3.4–4.8)
ALBUMIN/GLOB SERPL: 1.1 RATIO (ref 1.1–2)
ALP SERPL-CCNC: 83 UNIT/L (ref 40–150)
ALT SERPL-CCNC: 24 UNIT/L (ref 0–55)
ANION GAP SERPL CALC-SCNC: 7 MEQ/L
AST SERPL-CCNC: 19 UNIT/L (ref 11–45)
BASOPHILS # BLD AUTO: 0.04 X10(3)/MCL
BASOPHILS NFR BLD AUTO: 0.8 %
BILIRUB SERPL-MCNC: 0.3 MG/DL
BUN SERPL-MCNC: 24 MG/DL (ref 9.8–20.1)
CALCIUM SERPL-MCNC: 9.4 MG/DL (ref 8.4–10.2)
CHLORIDE SERPL-SCNC: 109 MMOL/L (ref 98–107)
CHOLEST SERPL-MCNC: 273 MG/DL
CHOLEST/HDLC SERPL: 6 {RATIO} (ref 0–5)
CO2 SERPL-SCNC: 27 MMOL/L (ref 23–31)
CREAT SERPL-MCNC: 1.4 MG/DL (ref 0.55–1.02)
CREAT/UREA NIT SERPL: 17
EOSINOPHIL # BLD AUTO: 0.11 X10(3)/MCL (ref 0–0.9)
EOSINOPHIL NFR BLD AUTO: 2.2 %
ERYTHROCYTE [DISTWIDTH] IN BLOOD BY AUTOMATED COUNT: 12.9 % (ref 11.5–17)
GFR SERPLBLD CREATININE-BSD FMLA CKD-EPI: 42 ML/MIN/1.73/M2
GLOBULIN SER-MCNC: 3.4 GM/DL (ref 2.4–3.5)
GLUCOSE SERPL-MCNC: 99 MG/DL (ref 82–115)
HCT VFR BLD AUTO: 40 % (ref 37–47)
HDLC SERPL-MCNC: 43 MG/DL (ref 35–60)
HGB BLD-MCNC: 13.4 G/DL (ref 12–16)
IMM GRANULOCYTES # BLD AUTO: 0.02 X10(3)/MCL (ref 0–0.04)
IMM GRANULOCYTES NFR BLD AUTO: 0.4 %
LDLC SERPL CALC-MCNC: 172 MG/DL (ref 50–140)
LYMPHOCYTES # BLD AUTO: 2.11 X10(3)/MCL (ref 0.6–4.6)
LYMPHOCYTES NFR BLD AUTO: 42.7 %
MCH RBC QN AUTO: 33 PG (ref 27–31)
MCHC RBC AUTO-ENTMCNC: 33.5 G/DL (ref 33–36)
MCV RBC AUTO: 98.5 FL (ref 80–94)
MONOCYTES # BLD AUTO: 0.46 X10(3)/MCL (ref 0.1–1.3)
MONOCYTES NFR BLD AUTO: 9.3 %
NEUTROPHILS # BLD AUTO: 2.2 X10(3)/MCL (ref 2.1–9.2)
NEUTROPHILS NFR BLD AUTO: 44.6 %
NRBC BLD AUTO-RTO: 0 %
PLATELET # BLD AUTO: 193 X10(3)/MCL (ref 130–400)
PMV BLD AUTO: 10.4 FL (ref 7.4–10.4)
POTASSIUM SERPL-SCNC: 3.7 MMOL/L (ref 3.5–5.1)
PROT SERPL-MCNC: 7.3 GM/DL (ref 5.8–7.6)
RBC # BLD AUTO: 4.06 X10(6)/MCL (ref 4.2–5.4)
SODIUM SERPL-SCNC: 143 MMOL/L (ref 136–145)
TRIGL SERPL-MCNC: 291 MG/DL (ref 37–140)
TSH SERPL-ACNC: 2.48 UIU/ML (ref 0.35–4.94)
VLDLC SERPL CALC-MCNC: 58 MG/DL
WBC # BLD AUTO: 4.94 X10(3)/MCL (ref 4.5–11.5)

## 2025-07-21 PROCEDURE — 36415 COLL VENOUS BLD VENIPUNCTURE: CPT

## 2025-07-21 PROCEDURE — 84443 ASSAY THYROID STIM HORMONE: CPT

## 2025-07-21 PROCEDURE — 80053 COMPREHEN METABOLIC PANEL: CPT

## 2025-07-21 PROCEDURE — 80061 LIPID PANEL: CPT

## 2025-07-21 PROCEDURE — 85025 COMPLETE CBC W/AUTO DIFF WBC: CPT

## 2025-08-13 ENCOUNTER — OFFICE VISIT (OUTPATIENT)
Dept: ORTHOPEDICS | Facility: CLINIC | Age: 65
End: 2025-08-13
Payer: MEDICARE

## 2025-08-13 VITALS
BODY MASS INDEX: 33.34 KG/M2 | SYSTOLIC BLOOD PRESSURE: 122 MMHG | WEIGHT: 176.56 LBS | HEIGHT: 61 IN | HEART RATE: 65 BPM | DIASTOLIC BLOOD PRESSURE: 89 MMHG

## 2025-08-13 DIAGNOSIS — Z98.890 S/P CUBITAL TUNNEL RELEASE: Primary | ICD-10-CM

## 2025-08-13 PROCEDURE — 99213 OFFICE O/P EST LOW 20 MIN: CPT | Mod: ,,,

## 2025-08-13 RX ORDER — VALSARTAN AND HYDROCHLOROTHIAZIDE 320; 25 MG/1; MG/1
TABLET, FILM COATED ORAL
COMMUNITY

## 2025-08-13 RX ORDER — PANTOPRAZOLE SODIUM 40 MG/1
TABLET, DELAYED RELEASE ORAL
COMMUNITY

## (undated) DEVICE — TRAY SKIN SCRUB DRY PREMIUM

## (undated) DEVICE — APPLICATOR CHLORAPREP ORN 26ML

## (undated) DEVICE — GLOVE PROTEXIS PF LATEX 7.0

## (undated) DEVICE — GLOVE PROTEXIS LTX 6.5

## (undated) DEVICE — SPLINT FIBERGLASS PAD 4X15

## (undated) DEVICE — DRAPE STERI U-SHAPED 47X51IN

## (undated) DEVICE — GOWN POLY REINF X-LONG 2XL

## (undated) DEVICE — SUT VICRYL 2-0 CT-2 VCP269H

## (undated) DEVICE — GLOVE SENSICARE PI SURG 8

## (undated) DEVICE — GLOVE SENSICARE PI GRN 7

## (undated) DEVICE — SUT MONOCRYL 4-0 PS-2

## (undated) DEVICE — SUT 4.0 ETHILON

## (undated) DEVICE — ADHESIVE SURG LIQ 2 OZ

## (undated) DEVICE — SUT ETHILON 3-0 FS-1 30

## (undated) DEVICE — GOWN ECLIPSE REINF LVL4 TWL XL

## (undated) DEVICE — Device

## (undated) DEVICE — SPONGE LAP 18X18 PREWASHED

## (undated) DEVICE — GLOVE SENSICARE PI SURG 6.5

## (undated) DEVICE — GLOVE SENSICARE PI ORTHO LT 7

## (undated) DEVICE — SUT CTD VICRYL 3-0 CR/SH

## (undated) DEVICE — GOWN ECLIPSE REINF LV4 TWL 2XL

## (undated) DEVICE — SPONGE GAUZE CURITY 8PLY 2X2IN

## (undated) DEVICE — SOL IRRI STRL WATER 1000ML

## (undated) DEVICE — GLOVE PROTEXIS HYDROGEL SZ8

## (undated) DEVICE — CLOSURE SKIN STERI STRIP 1/2X4

## (undated) DEVICE — GAUZE SPONGE BULKEE 6X6.75IN

## (undated) DEVICE — SUT ETHILON 5-0 P3 18IN BLK

## (undated) DEVICE — DRESSING TRANS 2X2 TEGADERM

## (undated) DEVICE — BANDAGE VELCLOSE ELAS 3INX5YD

## (undated) DEVICE — ELECTRODE REM PLYHSV RETURN 9

## (undated) DEVICE — GLOVE SENSICARE PI GRN 6.5

## (undated) DEVICE — SOL NACL IRR 1000ML BTL

## (undated) DEVICE — DRAPE HAND STERILE

## (undated) DEVICE — ELECTRODE BLADE INSULATED 1 IN

## (undated) DEVICE — KIT SURGICAL TURNOVER

## (undated) DEVICE — COVER LIGHT HANDLE FLEX GRN

## (undated) DEVICE — BLADE SURG STAINLESS STEEL #15

## (undated) DEVICE — CUFF ATS 2 PORT SNGL BLDR 18IN

## (undated) DEVICE — GLOVE SENSICARE PI SURG 7

## (undated) DEVICE — NDL HYPO POLYPR STD 26G 1.5IN

## (undated) DEVICE — GLOVE PROTEXIS HYDROGEL SZ6.5

## (undated) DEVICE — GLOVE PROTEXIS HYDROGEL SZ7

## (undated) DEVICE — BANDAGE GAUZE COT STRL 4.5X4.1

## (undated) DEVICE — SUT MONOCRYL 3-0 PS-2 UND

## (undated) DEVICE — ELECTRODE PATIENT RETURN DISP

## (undated) DEVICE — GAUZE SPONGE 4X4 12PLY